# Patient Record
Sex: FEMALE | Employment: FULL TIME | URBAN - METROPOLITAN AREA
[De-identification: names, ages, dates, MRNs, and addresses within clinical notes are randomized per-mention and may not be internally consistent; named-entity substitution may affect disease eponyms.]

---

## 2017-01-10 ENCOUNTER — OFFICE VISIT (OUTPATIENT)
Dept: INTERNAL MEDICINE | Facility: CLINIC | Age: 32
End: 2017-01-10
Payer: COMMERCIAL

## 2017-01-10 VITALS
HEART RATE: 99 BPM | RESPIRATION RATE: 16 BRPM | DIASTOLIC BLOOD PRESSURE: 84 MMHG | OXYGEN SATURATION: 95 % | WEIGHT: 146.2 LBS | BODY MASS INDEX: 25.08 KG/M2 | TEMPERATURE: 98.9 F | SYSTOLIC BLOOD PRESSURE: 128 MMHG

## 2017-01-10 DIAGNOSIS — K21.9 GASTROESOPHAGEAL REFLUX DISEASE WITHOUT ESOPHAGITIS: Primary | ICD-10-CM

## 2017-01-10 PROCEDURE — 99214 OFFICE O/P EST MOD 30 MIN: CPT | Performed by: INTERNAL MEDICINE

## 2017-01-10 NOTE — MR AVS SNAPSHOT
After Visit Summary   1/10/2017    Melissa Benavidez    MRN: [id number]           Patient Information     Date Of Birth          1985        Visit Information        Provider Department      1/10/2017 4:20 PM Fiona Vazquez MD [facility]        Today's Diagnoses     Gastroesophageal reflux disease without esophagitis    -  1       Care Instructions    Try to avoid caffeine, alcohol, ibuprofen.   If not any better in 10-14 days, call for further testing.     If symptoms go away, continue the omeprazole for 2 months. The last week take every other day.   If symptoms come back, try pepcid 20 mg daily instead. If that doesn't work, go back on the omeprazole.         Follow-ups after your visit        Your next 10 appointments already scheduled     Jan 17, 2017  4:15 PM   Nurse Only with [place] OB NURSE   [facility]    [address] [place]  [facility] [id number] [phone]            Feb 15, 2017  3:00 PM   PHYSICAL with Heather Villagomez,    [facility]    [address] [place]  [facility] [address] [phone]              Who to contact     If you have questions or need follow up information about today's clinic visit or your schedule please contact [facility] directly at [phone].  Normal or non-critical lab and imaging results will be communicated to you by MyChart, letter or phone within 4 business days after the clinic has received the results. If you do not hear from us within 7 days, please contact the clinic through MyChart or phone. If you have a critical or abnormal lab result, we will notify you by phone as soon as possible.  Submit refill requests through [employer] or call your pharmacy and they will forward the refill request to us. Please allow 3 business days for your refill to be completed.          Additional Information About Your  "Visit        [employer] Information     [employer] lets you send messages to your doctor, view your test results, renew your prescriptions, schedule appointments and more. To sign up, go to [email or web address] . Click on \"Log in\" on the left side of the screen, which will take you to the Welcome page. Then click on \"Sign up Now\" on the right side of the page.     You will be asked to enter the access code listed below, as well as some personal information. Please follow the directions to create your username and password.     Your access code is: [email or web address]  Expires: 4/10/2017  4:52 PM     Your access code will  in 90 days. If you need help or a new code, please call your [place] clinic or [phone].        Care EveryWhere ID     This is your Care EveryWhere ID. This could be used by other organizations to access your [place] medical records  [id number]        Your Vitals Were     Pulse Temperature Respirations Pulse Oximetry          99 98.9  F (37.2  C) (Oral) 16 95%         Blood Pressure from Last 3 Encounters:   01/10/17 128/84   10/27/16 120/80   16 100/80    Weight from Last 3 Encounters:   01/10/17 146 lb 3.2 oz (66.316 kg)   10/27/16 139 lb 11.2 oz (63.368 kg)   16 138 lb (62.596 kg)              Today, you had the following     No orders found for display         Today's Medication Changes          These changes are accurate as of: 1/10/17  4:52 PM.  If you have any questions, ask your nurse or doctor.               Start taking these medicines.        Dose/Directions    omeprazole 20 MG CR capsule   Commonly known as:  priLOSEC   Used for:  Gastroesophageal reflux disease without esophagitis   Started by:  Fiona Vazquez MD        Dose:  20 mg   Take 1 capsule (20 mg) by mouth daily   Quantity:  30 capsule   Refills:  3         These medicines have changed or have updated prescriptions.        Dose/Directions    amphetamine-dextroamphetamine 20 MG per tablet   Commonly known as:  " ADDERALL   This may have changed:  Another medication with the same name was removed. Continue taking this medication, and follow the directions you see here.   Used for:  Attention-deficit hyperactivity disorder, predominantly hyperactive type   Changed by:  Franca Mandujano MD        Dose:  20 mg   Take 1 tablet (20 mg) by mouth 2 times daily   Quantity:  60 tablet   Refills:  0            Where to get your medicines      These medications were sent to [id number] - [place], [place] - [address] [facility]  [address] [facility] [address]     Phone:  [phone]    - omeprazole 20 MG CR capsule             Primary Care Provider Office Phone # Fax #    Franca Mandujano [phone]       [facility] [address] [place] [address]        Thank you!     Thank you for choosing [facility]  for your care. Our goal is always to provide you with excellent care. Hearing back from our patients is one way we can continue to improve our services. Please take a few minutes to complete the written survey that you may receive in the mail after your visit with us. Thank you!             Your Updated Medication List - Protect others around you: Learn how to safely use, store and throw away your medicines at www.disposemymeds.org.          This list is accurate as of: 1/10/17  4:52 PM.  Always use your most recent med list.                   Brand Name Dispense Instructions for use    amphetamine-dextroamphetamine 20 MG per tablet    ADDERALL    60 tablet    Take 1 tablet (20 mg) by mouth 2 times daily       buPROPion 150 MG 24 hr tablet    WELLBUTRIN XL    90 tablet    Take 1 tablet (150 mg) by mouth every morning       FLUoxetine 10 MG capsule    PROzac    90 capsule    Take 1 capsule (10 mg) by mouth daily       ibuprofen 200 MG tablet    ADVIL/MOTRIN    30 tablet    Take 3 tablets by mouth every 8 hours as needed for pain.       medroxyPROGESTERone 150  MG/ML injection    DEPO-PROVERA    1 mL    Inject 1 mL (150 mg) into the muscle every 3 months       omeprazole 20 MG CR capsule    priLOSEC    30 capsule    Take 1 capsule (20 mg) by mouth daily

## 2017-01-10 NOTE — NURSING NOTE
"Chief Complaint   Patient presents with     Nausea     Nausea and vomiting - negative pregnancy test at home, checked 3 times. Possibly acid reflux- can taste bile in back of throat       Initial /84 mmHg  Pulse 99  Temp(Src) 98.9  F (37.2  C) (Oral)  Resp 16  Wt 146 lb 3.2 oz (66.316 kg)  SpO2 95% Estimated body mass index is 25.08 kg/(m^2) as calculated from the following:    Height as of 7/7/16: 5' 4\" (1.626 m).    Weight as of this encounter: 146 lb 3.2 oz (66.316 kg).  BP completed using cuff size: federico Phelps CMA      "

## 2017-01-10 NOTE — PROGRESS NOTES
SUBJECTIVE:                                                    Melissa Benavidez is a 31 year old female who presents to clinic today for the following health issues:    Nausea and vomiting: she reports for the past month or so she's been having episodes of significant nausea and vomiting almost every day. This will usually come on sometimes in the morning or early afternoon and will start with feeling significant heat and flushing followed by a pain in her head that is like a band across the top of the head and severe nausea. Within a fairly short period of time, 5-10 minutes she will start to have vomiting or dry heaves. The head pain will resolve very quickly after the dry heaves start. This may continue on for up to an hour or so but then typically will resolve without further episodes most days. She does not tend to get stomach pain with it but instead gets very strong aching sensation in her throat, can have some water brash and occasionally some belching. She reports she tends to have a small protein bar in the morning when she takes her Adderall but then usually does not eat food until evening. This does not seem to happen soon after eating. She did a pregnancy test 3 times and that was negative each time.    She recently started Senokot and Colace because of chronic constipation but is not sure that's related to the symptoms.  She has some occasional discomfort in the epigastrium but not severe pain. She does feel bloated on and off. Her stools are a little looser but that may be related to the Colace and Senokot.    She drinks caffeine 1-2 20 ounce beverages per day. She has alcohol a few times in the weekend but not daily, not excessive amounts each time. She is a smoker. She takes ibuprofen 2-3 tablets most days.    Patient Active Problem List   Diagnosis     Hyperlipidemia LDL goal <160     Migraine headache     ADHD (attention deficit hyperactivity disorder)     Anxiety     Mild major depression  (H)     Major depressive disorder, recurrent episode, mild (H)     Controlled substance agreement signed     Current Outpatient Prescriptions   Medication Sig Dispense Refill     amphetamine-dextroamphetamine (ADDERALL) 20 MG per tablet Take 1 tablet (20 mg) by mouth 2 times daily 60 tablet 0     FLUoxetine (PROZAC) 10 MG capsule Take 1 capsule (10 mg) by mouth daily 90 capsule 0     buPROPion (WELLBUTRIN XL) 150 MG 24 hr tablet Take 1 tablet (150 mg) by mouth every morning 90 tablet 0     medroxyPROGESTERone (DEPO-PROVERA) 150 MG/ML injection Inject 1 mL (150 mg) into the muscle every 3 months 1 mL 3     ibuprofen (ADVIL,MOTRIN) 200 MG tablet Take 3 tablets by mouth every 8 hours as needed for pain. 30 tablet 0     amphetamine-dextroamphetamine (ADDERALL) 10 MG tablet Take 1 tablet (10 mg) by mouth 2 times daily 60 tablet 0      Social History   Substance Use Topics     Smoking status: Current Every Day Smoker -- 0.50 packs/day for 3 years     Last Attempt to Quit: 2012     Smokeless tobacco: Never Used      Comment: 5 -10 cigarrets daily     Alcohol Use: 0.0 oz/week     0 Standard drinks or equivalent per week      Comment: 1-2 drinks/week      Past Surgical History   Procedure Laterality Date     Gyn surgery  3/5/11             ROS:  o fever, chills, hemoptysis, persistent abdominal pain, melena, chest pain, other headaches, focal neurologic symptoms    OBJECTIVE:                                                    /84 mmHg  Pulse 99  Temp(Src) 98.9  F (37.2  C) (Oral)  Resp 16  Wt 146 lb 3.2 oz (66.316 kg)  SpO2 95%  Body mass index is 25.08 kg/(m^2).    Abdomen: Bowel sounds normal, soft, there is mild tenderness in the midepigastrium without rebound or guarding. No hepatosplenomegaly. No masses.        ASSESSMENT/PLAN:                                                            1. Gastroesophageal reflux disease without esophagitis  Her symptoms are most suggestive of acid-related  irritation, likely some reflux, particularly with the water brash and gagging. It is less likely that these symptoms would be related to gallbladder issue but that is possible. Doubt pancreatitis as she does not have any persistent pain.  Recommend start on omeprazole 20 mg daily, avoid caffeine, alcohol, tobacco and NSAIDs. If not improving in 10-14 days, consider abdominal ultrasound and/or EGD.   See patient instructions.   Recommend ED for severe abdominal pain, unremitting nausea and vomiting  - omeprazole (PRILOSEC) 20 MG CR capsule; Take 1 capsule (20 mg) by mouth daily  Dispense: 30 capsule; Refill: 3        Fiona Vazquez MD  [facility]

## 2017-01-17 ENCOUNTER — ALLIED HEALTH/NURSE VISIT (OUTPATIENT)
Dept: NURSING | Facility: CLINIC | Age: 32
End: 2017-01-17
Payer: COMMERCIAL

## 2017-01-17 VITALS — BODY MASS INDEX: 24.76 KG/M2 | WEIGHT: 144.3 LBS | SYSTOLIC BLOOD PRESSURE: 118 MMHG | DIASTOLIC BLOOD PRESSURE: 78 MMHG

## 2017-01-17 PROCEDURE — 96372 THER/PROPH/DIAG INJ SC/IM: CPT

## 2017-01-17 NOTE — NURSING NOTE
"Chief Complaint   Patient presents with     Imm/Inj       Initial /78 mmHg  Wt 144 lb 4.8 oz (65.454 kg) Estimated body mass index is 24.76 kg/(m^2) as calculated from the following:    Height as of 16: 5' 4\" (1.626 m).    Weight as of this encounter: 144 lb 4.8 oz (65.454 kg).  BP completed using cuff size: regular        The following HM Due: Annual exam (scheduled for 02/15/17).      The following patient reported/Care Every where data was sent to:  P ABSTRACT QUALITY INITIATIVES [28117]                     "

## 2017-01-17 NOTE — MR AVS SNAPSHOT
"              After Visit Summary   1/17/2017    Melissa Benavidez    MRN: [id number]           Patient Information     Date Of Birth          1985        Visit Information        Provider Department      1/17/2017 4:15 PM RI OB NURSE [facility]        Today's Diagnoses     Contraception    -  1        Follow-ups after your visit        Your next 10 appointments already scheduled     Feb 15, 2017  3:00 PM   PHYSICAL with Heather Villagomez, DO   [facility]    [address]  [facility] [id number] [phone]              Who to contact     If you have questions or need follow up information about today's clinic visit or your schedule please contact [facility] directly at [phone].  Normal or non-critical lab and imaging results will be communicated to you by MyChart, letter or phone within 4 business days after the clinic has received the results. If you do not hear from us within 7 days, please contact the clinic through MyChart or phone. If you have a critical or abnormal lab result, we will notify you by phone as soon as possible.  Submit refill requests through [employer] or call your pharmacy and they will forward the refill request to us. Please allow 3 business days for your refill to be completed.          Additional Information About Your Visit        [employer] Information     [employer] lets you send messages to your doctor, view your test results, renew your prescriptions, schedule appointments and more. To sign up, go to [email or web address] . Click on \"Log in\" on the left side of the screen, which will take you to the Welcome page. Then click on \"Sign up Now\" on the right side of the page.     You will be asked to enter the access code listed below, as well as some personal information. Please follow the directions to create your username and password.     Your access code is: [email or web address]  Expires: " 4/10/2017  4:52 PM     Your access code will  in 90 days. If you need help or a new code, please call your [facility] or [phone].        Care EveryWhere ID     This is your Care EveryWhere ID. This could be used by other organizations to access your [place] medical records  [id number]         Blood Pressure from Last 3 Encounters:   17 118/78   01/10/17 128/84   10/27/16 120/80    Weight from Last 3 Encounters:   17 144 lb 4.8 oz (65.454 kg)   01/10/17 146 lb 3.2 oz (66.316 kg)   10/27/16 139 lb 11.2 oz (63.368 kg)              We Performed the Following     INJECTION INTRAMUSCULAR OR SUB-Q     Medroxyprogesterone inj  1mg   (Depo Provera J-Code)        Primary Care Provider Office Phone # Fax #    Franca Mandujano [phone]       [facility] [address] [place] [address]        Thank you!     Thank you for choosing [facility]  for your care. Our goal is always to provide you with excellent care. Hearing back from our patients is one way we can continue to improve our services. Please take a few minutes to complete the written survey that you may receive in the mail after your visit with us. Thank you!             Your Updated Medication List - Protect others around you: Learn how to safely use, store and throw away your medicines at www.disposemymeds.org.          This list is accurate as of: 17  4:42 PM.  Always use your most recent med list.                   Brand Name Dispense Instructions for use    amphetamine-dextroamphetamine 20 MG per tablet    ADDERALL    60 tablet    Take 1 tablet (20 mg) by mouth 2 times daily       buPROPion 150 MG 24 hr tablet    WELLBUTRIN XL    90 tablet    Take 1 tablet (150 mg) by mouth every morning       FLUoxetine 10 MG capsule    PROzac    90 capsule    Take 1 capsule (10 mg) by mouth daily       ibuprofen 200 MG tablet    ADVIL/MOTRIN    30 tablet    Take 3 tablets by mouth every  8 hours as needed for pain.       medroxyPROGESTERone 150 MG/ML injection    DEPO-PROVERA    1 mL    Inject 1 mL (150 mg) into the muscle every 3 months       omeprazole 20 MG CR capsule    priLOSEC    30 capsule    Take 1 capsule (20 mg) by mouth daily

## 2017-01-24 DIAGNOSIS — F90.1 ATTENTION-DEFICIT HYPERACTIVITY DISORDER, PREDOMINANTLY HYPERACTIVE TYPE: Primary | ICD-10-CM

## 2017-01-24 RX ORDER — DEXTROAMPHETAMINE SACCHARATE, AMPHETAMINE ASPARTATE, DEXTROAMPHETAMINE SULFATE AND AMPHETAMINE SULFATE 5; 5; 5; 5 MG/1; MG/1; MG/1; MG/1
20 TABLET ORAL 2 TIMES DAILY
Qty: 60 TABLET | Refills: 0 | Status: SHIPPED | OUTPATIENT
Start: 2017-01-24 | End: 2017-03-01

## 2017-01-24 NOTE — TELEPHONE ENCOUNTER
adderall    *please call when completed. Patient aware a week early and isn't expecting until time it's due.* [phone]  Last Written Prescription Date:  1/2/17  Last Fill Quantity: 60,   # refills: 0  Last Office Visit with [facility], [facility] or [facility] prescribing provider: 9/29/16 w/ Taylor for Add  Future Office visit:    Next 5 appointments (look out 90 days)     Feb 15, 2017  3:00 PM   PHYSICAL with Heather Villagomez,    [facility] ([facility])    [facility] [address]  [facility] [id number] [phone]                   Routing refill request to provider for review/approval because:  Drug not on the [facility], [facility] or [facility] refill protocol or controlled substance    Please bring down to pharmacy.

## 2017-02-15 ENCOUNTER — OFFICE VISIT (OUTPATIENT)
Dept: OBGYN | Facility: CLINIC | Age: 32
End: 2017-02-15
Payer: COMMERCIAL

## 2017-02-15 VITALS
SYSTOLIC BLOOD PRESSURE: 132 MMHG | WEIGHT: 143.5 LBS | HEIGHT: 64 IN | BODY MASS INDEX: 24.5 KG/M2 | DIASTOLIC BLOOD PRESSURE: 88 MMHG

## 2017-02-15 DIAGNOSIS — Z30.013 ENCOUNTER FOR INITIAL PRESCRIPTION OF INJECTABLE CONTRACEPTIVE: Primary | ICD-10-CM

## 2017-02-15 DIAGNOSIS — F51.01 PRIMARY INSOMNIA: ICD-10-CM

## 2017-02-15 DIAGNOSIS — Z00.00 ROUTINE GENERAL MEDICAL EXAMINATION AT A HEALTH CARE FACILITY: ICD-10-CM

## 2017-02-15 PROCEDURE — 99395 PREV VISIT EST AGE 18-39: CPT | Performed by: FAMILY MEDICINE

## 2017-02-15 RX ORDER — MEDROXYPROGESTERONE ACETATE 150 MG/ML
150 INJECTION, SUSPENSION INTRAMUSCULAR
Qty: 3 ML | Refills: 3 | OUTPATIENT
Start: 2017-02-15 | End: 2018-06-26

## 2017-02-15 RX ORDER — ZOLPIDEM TARTRATE 5 MG/1
5 TABLET ORAL
Qty: 30 TABLET | Refills: 1 | Status: SHIPPED | OUTPATIENT
Start: 2017-02-15 | End: 2017-05-03

## 2017-02-15 NOTE — PATIENT INSTRUCTIONS
ambien before bed   Return yearly     Dr. Heather Villagomez, DO    Obstetrics and Gynecology  [facility] [place]

## 2017-02-15 NOTE — LETTER
[place] [address]  [facility] [address]  [phone]        February 20, 2018    Melissa Benavidez  [address] [place] [address] [address]  [facility] [address]              Dear Melissa Benavidez    This is to remind you that your fasting lab is due.    You may call our office at [phone] to schedule an appointment.    Please disregard this notice if you have already had your labs drawn or made an appointment.        Sincerely,        Heather Villagomez MD

## 2017-02-15 NOTE — NURSING NOTE
"Chief Complaint   Patient presents with     Gyn Exam   Discuss sleep issues.  Alycia Melara MA      Initial /88  Ht 5' 4\" (1.626 m)  Wt 143 lb 8 oz (65.1 kg)  BMI 24.63 kg/m2 Estimated body mass index is 24.63 kg/(m^2) as calculated from the following:    Height as of this encounter: 5' 4\" (1.626 m).    Weight as of this encounter: 143 lb 8 oz (65.1 kg).  Medication Reconciliation: complete    "

## 2017-02-15 NOTE — MR AVS SNAPSHOT
After Visit Summary   2/15/2017    Melissa Benavidez    MRN: [id number]           Patient Information     Date Of Birth          1985        Visit Information        Provider Department      2/15/2017 3:00 PM Heather Villagomez,  [facility]        Today's Diagnoses     Encounter for initial prescription of injectable contraceptive    -  1    Routine general medical examination at a health care facility        Primary insomnia          Care Instructions    ambien before bed   Return yearly     Dr. Heather Villagomez, DO    Obstetrics and Gynecology  [facility]               Follow-ups after your visit        Future tests that were ordered for you today     Open Future Orders        Priority Expected Expires Ordered    CBC with platelets Routine  2/15/2018 2/15/2017    Comprehensive metabolic panel Routine  2/15/2018 2/15/2017    Lipid panel reflex to direct LDL Routine  2/15/2018 2/15/2017    TSH with free T4 reflex Routine  2/15/2018 2/15/2017            Who to contact     If you have questions or need follow up information about today's clinic visit or your schedule please contact [facility] directly at [phone].  Normal or non-critical lab and imaging results will be communicated to you by [employer], letter or phone within 4 business days after the clinic has received the results. If you do not hear from us within 7 days, please contact the clinic through [employer] or phone. If you have a critical or abnormal lab result, we will notify you by phone as soon as possible.  Submit refill requests through [employer] or call your pharmacy and they will forward the refill request to us. Please allow 3 business days for your refill to be completed.          Additional Information About Your Visit        MyChart Information     [employer] lets you send messages to your doctor, view your test results, renew your prescriptions, schedule  "appointments and more. To sign up, go to [email or web address] . Click on \"Log in\" on the left side of the screen, which will take you to the Welcome page. Then click on \"Sign up Now\" on the right side of the page.     You will be asked to enter the access code listed below, as well as some personal information. Please follow the directions to create your username and password.     Your access code is: [email or web address]  Expires: 4/10/2017  4:52 PM     Your access code will  in 90 days. If you need help or a new code, please call your [place] clinic or [phone].        Care EveryWhere ID     This is your Care EveryWhere ID. This could be used by other organizations to access your [place] medical records  [id number]        Your Vitals Were     Height BMI (Body Mass Index)                5' 4\" (1.626 m) 24.63 kg/m2           Blood Pressure from Last 3 Encounters:   02/15/17 132/88   17 118/78   01/10/17 128/84    Weight from Last 3 Encounters:   02/15/17 143 lb 8 oz (65.1 kg)   17 144 lb 4.8 oz (65.5 kg)   01/10/17 146 lb 3.2 oz (66.3 kg)                 Today's Medication Changes          These changes are accurate as of: 2/15/17  3:43 PM.  If you have any questions, ask your nurse or doctor.               Start taking these medicines.        Dose/Directions    zolpidem 5 MG tablet   Commonly known as:  AMBIEN   Used for:  Primary insomnia   Started by:  Heather Villagomez DO        Dose:  5 mg   Take 1 tablet (5 mg) by mouth nightly as needed for sleep   Quantity:  30 tablet   Refills:  1         These medicines have changed or have updated prescriptions.        Dose/Directions    * medroxyPROGESTERone 150 MG/ML injection   Commonly known as:  DEPO-PROVERA   This may have changed:  Another medication with the same name was added. Make sure you understand how and when to take each.   Used for:  Unspecified contraceptive management   Changed by:  Corey Villanueva MD        Dose:  150 mg "   Inject 1 mL (150 mg) into the muscle every 3 months   Quantity:  1 mL   Refills:  3       * medroxyPROGESTERone 150 MG/ML injection   Commonly known as:  DEPO-PROVERA   This may have changed:  You were already taking a medication with the same name, and this prescription was added. Make sure you understand how and when to take each.   Used for:  Encounter for initial prescription of injectable contraceptive   Changed by:  Heather Villagomez DO        Dose:  150 mg   Inject 1 mL (150 mg) into the muscle every 3 months   Quantity:  3 mL   Refills:  3       * Notice:  This list has 2 medication(s) that are the same as other medications prescribed for you. Read the directions carefully, and ask your doctor or other care provider to review them with you.         Where to get your medicines      Some of these will need a paper prescription and others can be bought over the counter.  Ask your nurse if you have questions.     Bring a paper prescription for each of these medications     zolpidem 5 MG tablet       You don't need a prescription for these medications     medroxyPROGESTERone 150 MG/ML injection                Primary Care Provider Office Phone # Fax #    Franca Mandujano [phone]       [facility] [address] [place] [address]        Thank you!     Thank you for choosing [facility]  for your care. Our goal is always to provide you with excellent care. Hearing back from our patients is one way we can continue to improve our services. Please take a few minutes to complete the written survey that you may receive in the mail after your visit with us. Thank you!             Your Updated Medication List - Protect others around you: Learn how to safely use, store and throw away your medicines at www.disposemymeds.org.          This list is accurate as of: 2/15/17  3:43 PM.  Always use your most recent med list.                   Brand Name Dispense  Instructions for use    amphetamine-dextroamphetamine 20 MG per tablet    ADDERALL    60 tablet    Take 1 tablet (20 mg) by mouth 2 times daily       buPROPion 150 MG 24 hr tablet    WELLBUTRIN XL    90 tablet    Take 1 tablet (150 mg) by mouth every morning       FLUoxetine 10 MG capsule    PROzac    90 capsule    Take 1 capsule (10 mg) by mouth daily       ibuprofen 200 MG tablet    ADVIL/MOTRIN    30 tablet    Take 3 tablets by mouth every 8 hours as needed for pain.       * medroxyPROGESTERone 150 MG/ML injection    DEPO-PROVERA    1 mL    Inject 1 mL (150 mg) into the muscle every 3 months       * medroxyPROGESTERone 150 MG/ML injection    DEPO-PROVERA    3 mL    Inject 1 mL (150 mg) into the muscle every 3 months       omeprazole 20 MG CR capsule    priLOSEC    30 capsule    Take 1 capsule (20 mg) by mouth daily       zolpidem 5 MG tablet    AMBIEN    30 tablet    Take 1 tablet (5 mg) by mouth nightly as needed for sleep       * Notice:  This list has 2 medication(s) that are the same as other medications prescribed for you. Read the directions carefully, and ask your doctor or other care provider to review them with you.

## 2017-02-15 NOTE — PROGRESS NOTES
SUBJECTIVE:  Melissa Benavidez is an 31 year old  woman who presents for annual gyn exam. No LMP recorded. Patient has had an injection. Periods are absent due to Depo. Dysmenorrhea:none. Cyclic symptoms include none. No intermenstrual bleeding, spotting, or discharge.    Current contraception: depo provera   ALEXUS exposure: no  History of abnormal Pap smear: No  Family history of uterine or ovarian cancer: No  Regular self breast exam: Yes  History of abnormal mammogram: No  Family history of breast cancer: Yes: maternal great aunt  History of abnormal lipids: No    She notes that it is very difficult for her to sleep at night, and this has been an ongoing issue for her. It will take her a while to get to sleep and she will wake up every 2 hours or so. She will have some caffeine during the day, but stops drinking it around 9 or 10 am. She has tried melatonin and tylenol PM. She has not tried a prescription sleep aid in the past.     She does not need any medication refills today.     Past Medical History   Diagnosis Date     Hyperlipidemia LDL goal <160 2011     Seizure (H)         Gyn Hx:  Menarch age teens  STD Hx: none    Family History   Problem Relation Age of Onset     Family History Negative Mother      Substance Abuse Father       44yo      HEART DISEASE Maternal Grandmother      heart attack     Family History Negative Son      born  [place]     Family History Negative Sister      1/2 bro     Family History Negative Brother      1/2 bro     DIABETES No family hx of      CEREBROVASCULAR DISEASE No family hx of      CANCER No family hx of      Neurologic Disorder No family hx of        Past Surgical History   Procedure Laterality Date     Gyn surgery  3/5/11            Current Outpatient Prescriptions   Medication     amphetamine-dextroamphetamine (ADDERALL) 20 MG per tablet     omeprazole (PRILOSEC) 20 MG CR capsule     FLUoxetine (PROZAC) 10 MG capsule     buPROPion  "(WELLBUTRIN XL) 150 MG 24 hr tablet     medroxyPROGESTERone (DEPO-PROVERA) 150 MG/ML injection     ibuprofen (ADVIL,MOTRIN) 200 MG tablet     No current facility-administered medications for this visit.      Allergies   Allergen Reactions     Clindamycin/Lincomycin      Vicodin [Hydrocodone-Acetaminophen]        Social History   Substance Use Topics     Smoking status: Current Every Day Smoker     Packs/day: 0.50     Years: 3.00     Last attempt to quit: 12/27/2012     Smokeless tobacco: Never Used      Comment: 5 -10 cigarrets daily     Alcohol use 0.0 oz/week     0 Standard drinks or equivalent per week      Comment: 1-2 drinks/week       Review Of Systems  Ears/Nose/Throat: negative  Respiratory: No shortness of breath, dyspnea on exertion, cough, or hemoptysis  Cardiovascular: negative  Gastrointestinal: negative  Genitourinary: negative    This document serves as a record of the services and decisions personally performed and made by Dr. Heather Villagomez DO. It was created on her behalf by Reta Tomas, a trained medical scribe. The creation of this document is based the provider's statements to the medical scribe.  Reta Tomas February 15, 2017 3:07 PM       OBJECTIVE:  /88  Ht 5' 4\" (1.626 m)  Wt 143 lb 8 oz (65.1 kg)  BMI 24.63 kg/m2  General appearance: healthy, alert and no distress  Skin: Skin color, texture, turgor normal. No rashes or lesions.  Nose/Sinuses: Nares normal. No drainage.  Oropharynx: Lips, mucosa, and tongue normal. Teeth and gums normal.  Neck: Neck supple. No adenopathy. Thyroid symmetric, normal size,  Lungs: Percussion normal. Good diaphragmatic excursion. Lungs clear  Heart: PMI normal. No lifts, heaves, or thrills. RRR. No murmurs, clicks gallops or rub  Breasts: Inspection negative. No nipple discharge or bleeding. No masses.  Abdomen: Abdomen soft, non-tender. BS normal. No masses, organomegaly  Pelvic: Pelvic:  Pelvic examination   External genitalia normal   and vagina normal " rugatted   Examination of urethra normal no masses, tenderness, scarring  bladder, no masses or tenderness  Cervix no lesions or discharge  Bimanual exam with   Uterus 7 weeks size, mid position, mobile,no-tenderness, no descent   Adnexa/parametria  Without masses or tenderness  Rectovaginal deferred     ASSESSMENT:  Satisfactory annual gyn exam    PLAN:  Dx:  1)  Pap smear  2)  Mammography, lipids at appropriate intervals  3) insomnia - will try Ambien, discussed possible side effects and dosing    Rx:  Ambien 5 mg    PE:  Reviewed health maintenance including diet, regular exercise   and periodic exams.    The information in this document, created by the medical scribe for me, accurately reflects the services I personally performed and the decisions made by me. I have reviewed and approved this document for accuracy prior to leaving the patient care area.  2/15/2017 3:07 PM         Dr. Heather Villagomez, DO    OB/GYN   [facility]

## 2017-02-28 DIAGNOSIS — F90.1 ATTENTION-DEFICIT HYPERACTIVITY DISORDER, PREDOMINANTLY HYPERACTIVE TYPE: ICD-10-CM

## 2017-02-28 NOTE — TELEPHONE ENCOUNTER
Reason for Call:  Medication or medication refill:    Do you use a [facility]?  Name of the pharmacy and phone number for the current request:  [facility] [address] ([place]) - [phone]    Name of the medication requested: amphetamine-dextroamphetamine (ADDERALL) 20 MG per tablet    Other request: Please send bring written Rx to [facility] downstairs and call patient when done.    Can we leave a detailed message on this number? YES    Phone number patient can be reached at: Cell number on file:    Telephone Information:   Mobile [phone]       Best Time: any    Call taken on 2/28/2017 at 10:50 AM by Anna Corrigan

## 2017-03-01 RX ORDER — DEXTROAMPHETAMINE SACCHARATE, AMPHETAMINE ASPARTATE, DEXTROAMPHETAMINE SULFATE AND AMPHETAMINE SULFATE 5; 5; 5; 5 MG/1; MG/1; MG/1; MG/1
20 TABLET ORAL 2 TIMES DAILY
Qty: 60 TABLET | Refills: 0 | Status: SHIPPED | OUTPATIENT
Start: 2017-03-01 | End: 2017-03-27

## 2017-03-01 NOTE — TELEPHONE ENCOUNTER
Adderall      Last Written Prescription Date:  01/24/17  Last Fill Quantity: 60,   # refills: 0  Last Office Visit with [facility] or [facility] prescribing provider: 01/10/17  Future Office visit:       Routing refill request to provider for review/approval because:  Drug not on the [facility], [facility] or [facility] refill protocol or controlled substance    CSA in epic. Rx to [facility] when available.    Please advise, thanks.

## 2017-03-27 DIAGNOSIS — F90.1 ATTENTION-DEFICIT HYPERACTIVITY DISORDER, PREDOMINANTLY HYPERACTIVE TYPE: ICD-10-CM

## 2017-03-27 NOTE — TELEPHONE ENCOUNTER
Adderall      Last Written Prescription Date:  3/1/17  Last Fill Quantity: 60,   # refills: 0  Last Office Visit with [facility] or [facility] prescribing provider: 2/15/17  Future Office visit:       Routing refill request to provider for review/approval because:  Drug not on the [facility] or [facility] refill protocol or controlled substance

## 2017-03-29 RX ORDER — DEXTROAMPHETAMINE SACCHARATE, AMPHETAMINE ASPARTATE, DEXTROAMPHETAMINE SULFATE AND AMPHETAMINE SULFATE 5; 5; 5; 5 MG/1; MG/1; MG/1; MG/1
20 TABLET ORAL 2 TIMES DAILY
Qty: 60 TABLET | Refills: 0 | Status: SHIPPED | OUTPATIENT
Start: 2017-03-29 | End: 2017-05-09

## 2017-04-11 ENCOUNTER — ALLIED HEALTH/NURSE VISIT (OUTPATIENT)
Dept: NURSING | Facility: CLINIC | Age: 32
End: 2017-04-11
Payer: COMMERCIAL

## 2017-04-11 VITALS — BODY MASS INDEX: 25.63 KG/M2 | SYSTOLIC BLOOD PRESSURE: 132 MMHG | DIASTOLIC BLOOD PRESSURE: 70 MMHG | WEIGHT: 149.3 LBS

## 2017-04-11 DIAGNOSIS — Z30.42 ENCOUNTER FOR SURVEILLANCE OF INJECTABLE CONTRACEPTIVE: Primary | ICD-10-CM

## 2017-04-11 DIAGNOSIS — F41.9 ANXIETY: ICD-10-CM

## 2017-04-11 PROCEDURE — 96372 THER/PROPH/DIAG INJ SC/IM: CPT

## 2017-04-11 RX ORDER — BUPROPION HYDROCHLORIDE 150 MG/1
TABLET ORAL
Qty: 90 TABLET | Refills: 0 | Status: SHIPPED | OUTPATIENT
Start: 2017-04-11 | End: 2017-06-12

## 2017-04-11 NOTE — MR AVS SNAPSHOT
"              After Visit Summary   2017    Melissa Benavidez    MRN: [id number]           Patient Information     Date Of Birth          1985        Visit Information        Provider Department      2017 4:15 PM [facility] OB NURSE [facility]        Today's Diagnoses     Encounter for surveillance of injectable contraceptive    -  1       Follow-ups after your visit        Who to contact     If you have questions or need follow up information about today's clinic visit or your schedule please contact [facility] directly at [phone].  Normal or non-critical lab and imaging results will be communicated to you by [employer]hart, letter or phone within 4 business days after the clinic has received the results. If you do not hear from us within 7 days, please contact the clinic through [employer] or phone. If you have a critical or abnormal lab result, we will notify you by phone as soon as possible.  Submit refill requests through [employer] or call your pharmacy and they will forward the refill request to us. Please allow 3 business days for your refill to be completed.          Additional Information About Your Visit        MyChart Information     [employer] lets you send messages to your doctor, view your test results, renew your prescriptions, schedule appointments and more. To sign up, go to [email or web address] . Click on \"Log in\" on the left side of the screen, which will take you to the Welcome page. Then click on \"Sign up Now\" on the right side of the page.     You will be asked to enter the access code listed below, as well as some personal information. Please follow the directions to create your username and password.     Your access code is: [email or web address]  Expires: 7/10/2017  5:02 PM     Your access code will  in 90 days. If you need help or a new code, please call your [facility] or [phone].        Care EveryWhere ID     This is your Care EveryWhere ID. " This could be used by other organizations to access your [place] medical records  [id number]        Your Vitals Were     BMI (Body Mass Index)                   25.63 kg/m2            Blood Pressure from Last 3 Encounters:   04/11/17 132/70   02/15/17 132/88   01/17/17 118/78    Weight from Last 3 Encounters:   04/11/17 149 lb 4.8 oz (67.7 kg)   02/15/17 143 lb 8 oz (65.1 kg)   01/17/17 144 lb 4.8 oz (65.5 kg)              We Performed the Following     Medroxyprogesterone inj  1mg   (Depo Provera J-Code)          Today's Medication Changes          These changes are accurate as of: 4/11/17  5:02 PM.  If you have any questions, ask your nurse or doctor.               These medicines have changed or have updated prescriptions.        Dose/Directions    buPROPion 150 MG 24 hr tablet   Commonly known as:  WELLBUTRIN XL   This may have changed:  See the new instructions.   Used for:  Anxiety   Changed by:  Franca Mandujano MD        TAKE 1 TABLET (150 MG) BY MOUTH EVERY MORNING   Quantity:  90 tablet   Refills:  0            Where to get your medicines      These medications were sent to [facility]/pharmacy #[id number] - [place], [place] - [address] [facility]  [address] [facility] [address]     Phone:  [phone]     buPROPion 150 MG 24 hr tablet                Primary Care Provider Office Phone # Fax #    Franca Mandujano [phone]       [facility] [address] [place] [address]        Thank you!     Thank you for choosing [facility]  for your care. Our goal is always to provide you with excellent care. Hearing back from our patients is one way we can continue to improve our services. Please take a few minutes to complete the written survey that you may receive in the mail after your visit with us. Thank you!             Your Updated Medication List - Protect others around you: Learn how to safely use, store and throw away your medicines at  www.disposemymeds.org.          This list is accurate as of: 4/11/17  5:02 PM.  Always use your most recent med list.                   Brand Name Dispense Instructions for use    amphetamine-dextroamphetamine 20 MG per tablet    ADDERALL    60 tablet    Take 1 tablet (20 mg) by mouth 2 times daily       buPROPion 150 MG 24 hr tablet    WELLBUTRIN XL    90 tablet    TAKE 1 TABLET (150 MG) BY MOUTH EVERY MORNING       FLUoxetine 10 MG capsule    PROzac    90 capsule    Take 1 capsule (10 mg) by mouth daily       ibuprofen 200 MG tablet    ADVIL/MOTRIN    30 tablet    Take 3 tablets by mouth every 8 hours as needed for pain.       * medroxyPROGESTERone 150 MG/ML injection    DEPO-PROVERA    1 mL    Inject 1 mL (150 mg) into the muscle every 3 months       * medroxyPROGESTERone 150 MG/ML injection    DEPO-PROVERA    3 mL    Inject 1 mL (150 mg) into the muscle every 3 months       omeprazole 20 MG CR capsule    priLOSEC    30 capsule    Take 1 capsule (20 mg) by mouth daily       zolpidem 5 MG tablet    AMBIEN    30 tablet    Take 1 tablet (5 mg) by mouth nightly as needed for sleep       * Notice:  This list has 2 medication(s) that are the same as other medications prescribed for you. Read the directions carefully, and ask your doctor or other care provider to review them with you.

## 2017-04-11 NOTE — NURSING NOTE
"Chief Complaint   Patient presents with     Imm/Inj     Depo       Initial /70  Wt 149 lb 4.8 oz (67.7 kg)  BMI 25.63 kg/m2 Estimated body mass index is 25.63 kg/(m^2) as calculated from the following:    Height as of 2/15/17: 5' 4\" (1.626 m).    Weight as of this encounter: 149 lb 4.8 oz (67.7 kg).  BP completed using cuff size: regular        The following HM Due: NONE  Lab Results   Component Value Date    PAP NIL 2015         The following patient reported/Care Every where data was sent to:  P ABSTRACT QUALITY INITIATIVES [73703]       patient has appointment for today             "

## 2017-04-17 DIAGNOSIS — F41.9 ANXIETY: ICD-10-CM

## 2017-04-17 RX ORDER — FLUOXETINE 10 MG/1
CAPSULE ORAL
Qty: 90 CAPSULE | Refills: 1 | Status: SHIPPED | OUTPATIENT
Start: 2017-04-17 | End: 2018-05-08

## 2017-04-24 ENCOUNTER — TELEPHONE (OUTPATIENT)
Dept: OBGYN | Facility: CLINIC | Age: 32
End: 2017-04-24

## 2017-04-24 NOTE — TELEPHONE ENCOUNTER
Pt stopped in the office and is requesting to increase her ambien dosage to 10 mg.   She is currently taking 5 mg.     Please advise.     Thanks.   Pat

## 2017-04-26 NOTE — TELEPHONE ENCOUNTER
Please let her know I would prefer that she discuss this with family practice or internal medicine physician as the upper limit   Recommended for ambien in women is 5 mg.     Dr. Heather Villagomez, DO    Obstetrics and Gynecology  [facility]

## 2017-05-01 DIAGNOSIS — F51.01 PRIMARY INSOMNIA: ICD-10-CM

## 2017-05-01 NOTE — TELEPHONE ENCOUNTER
Recommend not increasing ambien.  Could trial the longer acting- ambien 6.25mg.  Or could trial a different sleeping medication.

## 2017-05-01 NOTE — TELEPHONE ENCOUNTER
Pt calls, she was started on Ambien 5 mg by Dr. Villagomez about 2 months ago. Pt states that this helps her fall asleep, but she still wakes up frequently during the night. She asked Dr. Villagomez about increasing the dose and she advised her to speak to Dr. Mandujano as the FDA recommends women take 5mg. See 4/24/17 Telephone encounter for details.     Pt asking if Dr. Danielle would be able to increase the dose for her.

## 2017-05-03 RX ORDER — ZOLPIDEM TARTRATE 6.25 MG/1
6.25 TABLET, FILM COATED, EXTENDED RELEASE ORAL
Qty: 60 TABLET | Refills: 0 | Status: SHIPPED | OUTPATIENT
Start: 2017-05-03 | End: 2017-07-17

## 2017-05-03 NOTE — TELEPHONE ENCOUNTER
Pt called back, she would like to trial the 6.25 long-acting Ambien.  She is getting asleep but she wakes and can't get back to sleep.    Med pended  Pharmacy listed  Please advise  Billy PIZARRO RN

## 2017-05-09 DIAGNOSIS — F90.1 ATTENTION-DEFICIT HYPERACTIVITY DISORDER, PREDOMINANTLY HYPERACTIVE TYPE: ICD-10-CM

## 2017-05-09 NOTE — TELEPHONE ENCOUNTER
Reason for call: Medication   If this is a refill request, has the caller requested the refill from the pharmacy already? No  Will the patient be using a [facility]? Yes  Name of the pharmacy and phone number for the current request:     Name of the medication requested: Adderall 20 mg    Other request: anytime    Phone Number Pt can be reached at: Cell number on file:    Telephone Information:   Mobile [phone]     Best Time: Anytime  Can we leave a detailed message on this number? YES

## 2017-05-09 NOTE — TELEPHONE ENCOUNTER
Adderall      Last Written Prescription Date:  3-29-17  Last Fill Quantity: 60,   # refills: 0  Last Office Visit with [facility] or  [facility] prescribing provider: 1-10-17  Future Office visit:       Routing refill request to provider for review/approval because:  Drug not on the [facility], [facility] or  Health refill protocol or controlled substance    Signed CSA form in chart.    Left message for pt to call back to verify which pharm she uses for Adderall.

## 2017-05-10 RX ORDER — DEXTROAMPHETAMINE SACCHARATE, AMPHETAMINE ASPARTATE, DEXTROAMPHETAMINE SULFATE AND AMPHETAMINE SULFATE 5; 5; 5; 5 MG/1; MG/1; MG/1; MG/1
20 TABLET ORAL 2 TIMES DAILY
Qty: 60 TABLET | Refills: 0 | Status: SHIPPED | OUTPATIENT
Start: 2017-05-10 | End: 2017-06-12

## 2017-05-14 DIAGNOSIS — F41.9 ANXIETY: ICD-10-CM

## 2017-05-15 RX ORDER — BUPROPION HYDROCHLORIDE 150 MG/1
TABLET ORAL
Qty: 90 TABLET | Refills: 0 | OUTPATIENT
Start: 2017-05-15

## 2017-05-15 NOTE — TELEPHONE ENCOUNTER
Bupropion       Last Written Prescription Date: 04/11/17   (not needed yet)  Last Fill Quantity: 90; # refills: 0  Last Office Visit with [facility], [facility] or [facility] prescribing provider:  02/15/17 Dahlia        Last PHQ-9 score on record=   PHQ-9 SCORE 6/6/2016   Total Score -   Total Score 5       Lab Results   Component Value Date    AST 16 07/08/2016     Lab Results   Component Value Date    ALT 31 07/08/2016       Labs showing if normal/abnormal  Lab Results   Component Value Date    AST 16 07/08/2016    ALT 31 07/08/2016

## 2017-05-21 DIAGNOSIS — F41.9 ANXIETY: ICD-10-CM

## 2017-05-22 RX ORDER — BUPROPION HYDROCHLORIDE 150 MG/1
TABLET ORAL
Qty: 90 TABLET | Refills: 0 | OUTPATIENT
Start: 2017-05-22

## 2017-05-22 NOTE — TELEPHONE ENCOUNTER
Early fill? See below    buPROPion (WELLBUTRIN XL) 150 MG 24 hr tablet 90 tablet 0 4/11/2017  No   Sig: TAKE 1 TABLET (150 MG) BY MOUTH EVERY MORNING   Class: E-Prescribe   Order: [id number]   E-Prescribing Status: Receipt confirmed by pharmacy (4/11/2017 10:49 AM CDT)

## 2017-06-04 DIAGNOSIS — F51.01 PRIMARY INSOMNIA: ICD-10-CM

## 2017-06-04 DIAGNOSIS — F90.1 ATTENTION-DEFICIT HYPERACTIVITY DISORDER, PREDOMINANTLY HYPERACTIVE TYPE: ICD-10-CM

## 2017-06-04 DIAGNOSIS — F41.9 ANXIETY: ICD-10-CM

## 2017-06-11 DIAGNOSIS — K21.9 GASTROESOPHAGEAL REFLUX DISEASE WITHOUT ESOPHAGITIS: ICD-10-CM

## 2017-06-12 RX ORDER — BUPROPION HYDROCHLORIDE 150 MG/1
150 TABLET ORAL EVERY MORNING
Qty: 90 TABLET | Refills: 0 | OUTPATIENT
Start: 2017-06-12

## 2017-06-12 RX ORDER — BUPROPION HYDROCHLORIDE 150 MG/1
TABLET ORAL
Qty: 90 TABLET | Refills: 0 | Status: SHIPPED | OUTPATIENT
Start: 2017-06-12 | End: 2018-05-08

## 2017-06-12 RX ORDER — DEXTROAMPHETAMINE SACCHARATE, AMPHETAMINE ASPARTATE, DEXTROAMPHETAMINE SULFATE AND AMPHETAMINE SULFATE 5; 5; 5; 5 MG/1; MG/1; MG/1; MG/1
20 TABLET ORAL 2 TIMES DAILY
Qty: 60 TABLET | Refills: 0 | Status: SHIPPED | OUTPATIENT
Start: 2017-06-12 | End: 2017-06-19

## 2017-06-12 NOTE — TELEPHONE ENCOUNTER
Patient called and left message to call back.    Called patient and patient informed she is due for an office visit for Wellbutrin.  Patient also requests a refill for Adderall.  Transferred to scheduling to make an office visit.    Adderall 20 mg       Last Written Prescription Date:  5/10/17  Last Fill Quantity: 60,   # refills: 0  Last Office Visit with [facility], [facility] or [facility] prescribing provider: 1/10/17 [facility]  Future Office visit:       Routing refill request to provider for review/approval because:  Drug not on the [facility], [facility] or [facility] refill protocol or controlled substance  Signed CSA form in chart.      Bupropion  Routing refill request to provider for review/approval because:  Patient needs to be seen because it has been more than 1 year since last office visit.

## 2017-06-13 NOTE — TELEPHONE ENCOUNTER
Omeprazole       Last Written Prescription Date: 1/10/17  Last Fill Quantity: 30,  # refills: 3   Last Office Visit with G, P or [facility] prescribing provider: 1/10/17    Need update-Called pt, left message       Per 1/10/17 dict-If symptoms go away, continue the omeprazole for 2 months. The last week take every other day.   If symptoms come back, try pepcid 20 mg daily instead. If that doesn't work, go back on the omeprazole.

## 2017-06-19 DIAGNOSIS — F90.1 ATTENTION-DEFICIT HYPERACTIVITY DISORDER, PREDOMINANTLY HYPERACTIVE TYPE: ICD-10-CM

## 2017-06-19 RX ORDER — DEXTROAMPHETAMINE SACCHARATE, AMPHETAMINE ASPARTATE, DEXTROAMPHETAMINE SULFATE AND AMPHETAMINE SULFATE 5; 5; 5; 5 MG/1; MG/1; MG/1; MG/1
20 TABLET ORAL 2 TIMES DAILY
Qty: 60 TABLET | Refills: 0 | Status: SHIPPED | OUTPATIENT
Start: 2017-06-19 | End: 2017-07-17

## 2017-06-19 NOTE — TELEPHONE ENCOUNTER
Adderall XR 20      Last Written Prescription Date: 06/12/2017  Last Fill Quantity: 60,  # refills: 0   Last Office Visit with [facility], [facility] or [facility] prescribing provider: 02/15/2017                                               Thanks!    Alia Pranke, Float Technician   [place] for [facility]

## 2017-06-21 NOTE — TELEPHONE ENCOUNTER
Pt calls back informed she is due for appt. Pt unable to schedule right now, she will call back later to do this. Pt advised she needs to schedule before refill can be approved and she should speak to RN after she schedules appt.

## 2017-07-17 DIAGNOSIS — F51.01 PRIMARY INSOMNIA: ICD-10-CM

## 2017-07-17 DIAGNOSIS — F90.1 ATTENTION-DEFICIT HYPERACTIVITY DISORDER, PREDOMINANTLY HYPERACTIVE TYPE: ICD-10-CM

## 2017-07-17 RX ORDER — DEXTROAMPHETAMINE SACCHARATE, AMPHETAMINE ASPARTATE, DEXTROAMPHETAMINE SULFATE AND AMPHETAMINE SULFATE 5; 5; 5; 5 MG/1; MG/1; MG/1; MG/1
20 TABLET ORAL 2 TIMES DAILY
Qty: 60 TABLET | Refills: 0 | Status: SHIPPED | OUTPATIENT
Start: 2017-07-17 | End: 2017-08-17

## 2017-07-17 RX ORDER — ZOLPIDEM TARTRATE 6.25 MG/1
6.25 TABLET, FILM COATED, EXTENDED RELEASE ORAL
Qty: 60 TABLET | Refills: 0 | Status: SHIPPED | OUTPATIENT
Start: 2017-07-17 | End: 2017-09-12

## 2017-07-17 NOTE — TELEPHONE ENCOUNTER
Rx's signed by provider.  Rx hand carried to [facility].  Pt informed.

## 2017-07-17 NOTE — TELEPHONE ENCOUNTER
Adderall 20mg; Ambien XR 10mg      Last Written Prescription Date:  6/19/17; 5/3/17  Last Fill Quantity: 60,   # refills: 0  Last Office Visit with [facility], [facility] or [facility] prescribing provider: 1/10/17  Future Office visit:    Next 5 appointments (look out 90 days)     Jul 18, 2017  4:15 PM CDT   Nurse Only with [place] OB NURSE   [facility] ([facility])    [address]  [facility] [id number] [phone]                 CSA on file.   Walk to [facility], notify pt when dropped off.     Routing refill request to provider for review/approval because:  Drug not on the [facility], [facility] or [facility] refill protocol or controlled substance

## 2017-07-17 NOTE — TELEPHONE ENCOUNTER
Reason for Call:  Medication or medication refill:    Do you use a [facility]?  Name of the pharmacy and phone number for the current request:  [facility] [address] ([place]) - [phone]    Name of the medication requested: adderall 20 mg and ambien xr 10 mg    Other request: none    Can we leave a detailed message on this number? YES    Phone number patient can be reached at: Cell number on file:    Telephone Information:   Mobile [phone]       Best Time: any    Call taken on 7/17/2017 at 3:35 PM by Jenna Quick

## 2017-07-19 ENCOUNTER — ALLIED HEALTH/NURSE VISIT (OUTPATIENT)
Dept: NURSING | Facility: CLINIC | Age: 32
End: 2017-07-19
Payer: COMMERCIAL

## 2017-07-19 VITALS
SYSTOLIC BLOOD PRESSURE: 102 MMHG | WEIGHT: 143 LBS | BODY MASS INDEX: 24.55 KG/M2 | TEMPERATURE: 98.3 F | DIASTOLIC BLOOD PRESSURE: 80 MMHG

## 2017-07-19 LAB — HCG, QUAL URINE: NEGATIVE

## 2017-07-19 PROCEDURE — 96372 THER/PROPH/DIAG INJ SC/IM: CPT

## 2017-07-19 PROCEDURE — 84703 CHORIONIC GONADOTROPIN ASSAY: CPT

## 2017-07-19 PROCEDURE — 99207 ZZC NO CHARGE NURSE ONLY: CPT

## 2017-08-17 ENCOUNTER — TELEPHONE (OUTPATIENT)
Dept: INTERNAL MEDICINE | Facility: CLINIC | Age: 32
End: 2017-08-17

## 2017-08-17 DIAGNOSIS — F90.1 ATTENTION-DEFICIT HYPERACTIVITY DISORDER, PREDOMINANTLY HYPERACTIVE TYPE: ICD-10-CM

## 2017-08-17 NOTE — TELEPHONE ENCOUNTER
Adderall      Last Written Prescription Date:  7-17-17  Last Fill Quantity: 60,   # refills: 0  Last Office Visit with [facility], P or  Health prescribing provider: 9-29-16  Future Office visit:       Routing refill request to provider for review/approval because:  Drug not on the [facility], P or  Health refill protocol or controlled substance    Signed CSA form in chart.    [facility] carry [facility].    Please advise, thanks.

## 2017-08-17 NOTE — TELEPHONE ENCOUNTER
Reason for Call:  Medication or medication refill:    Do you use a [facility]?  Name of the pharmacy and phone number for the current request:  [facility] [address] ([place]) - [phone]    Name of the medication requested: adderall    Other request: none    Can we leave a detailed message on this number? YES    Phone number patient can be reached at: Home number on file [phone] (home)    Best Time: any    Call taken on 8/17/2017 at 10:49 AM by Milly Loyola

## 2017-08-18 RX ORDER — DEXTROAMPHETAMINE SACCHARATE, AMPHETAMINE ASPARTATE, DEXTROAMPHETAMINE SULFATE AND AMPHETAMINE SULFATE 5; 5; 5; 5 MG/1; MG/1; MG/1; MG/1
20 TABLET ORAL 2 TIMES DAILY
Qty: 60 TABLET | Refills: 0 | Status: SHIPPED | OUTPATIENT
Start: 2017-08-18 | End: 2017-09-12

## 2017-09-03 ENCOUNTER — HEALTH MAINTENANCE LETTER (OUTPATIENT)
Age: 32
End: 2017-09-03

## 2017-09-12 DIAGNOSIS — F90.1 ATTENTION-DEFICIT HYPERACTIVITY DISORDER, PREDOMINANTLY HYPERACTIVE TYPE: ICD-10-CM

## 2017-09-12 DIAGNOSIS — F51.01 PRIMARY INSOMNIA: ICD-10-CM

## 2017-09-12 NOTE — TELEPHONE ENCOUNTER
Pt left voice message requesting refill for Adderall and Ambien.     Left voice message for pt message was received and will send to [facility]. Asked pt to call back if she prefers a different pharmacy.    Fill at [facility].     Adderall 20mg; Zolpidem 6.25mg CR      Last Written Prescription Date:  8/18/17; 7/17/17  Last Fill Quantity: 60,   # refills: 0  Last Office Visit with [facility], P or  Health prescribing provider: 1/10/17  Future Office visit:       CSA on file.     Routing refill request to provider for review/approval because:  Drug not on the [facility], P or [employer] refill protocol or controlled substance

## 2017-09-13 RX ORDER — DEXTROAMPHETAMINE SACCHARATE, AMPHETAMINE ASPARTATE, DEXTROAMPHETAMINE SULFATE AND AMPHETAMINE SULFATE 5; 5; 5; 5 MG/1; MG/1; MG/1; MG/1
20 TABLET ORAL 2 TIMES DAILY
Qty: 60 TABLET | Refills: 0 | Status: SHIPPED | OUTPATIENT
Start: 2017-09-13 | End: 2017-10-25

## 2017-09-13 RX ORDER — ZOLPIDEM TARTRATE 6.25 MG/1
6.25 TABLET, FILM COATED, EXTENDED RELEASE ORAL
Qty: 60 TABLET | Refills: 0 | Status: SHIPPED | OUTPATIENT
Start: 2017-09-13 | End: 2017-11-29

## 2017-09-13 NOTE — TELEPHONE ENCOUNTER
Faxed Ambien and brought down Adderall RX down to [facility], left V/M for pt as well letting her know

## 2017-10-25 DIAGNOSIS — F90.1 ATTENTION-DEFICIT HYPERACTIVITY DISORDER, PREDOMINANTLY HYPERACTIVE TYPE: ICD-10-CM

## 2017-10-25 RX ORDER — DEXTROAMPHETAMINE SACCHARATE, AMPHETAMINE ASPARTATE, DEXTROAMPHETAMINE SULFATE AND AMPHETAMINE SULFATE 5; 5; 5; 5 MG/1; MG/1; MG/1; MG/1
20 TABLET ORAL 2 TIMES DAILY
Qty: 60 TABLET | Refills: 0 | Status: SHIPPED | OUTPATIENT
Start: 2017-10-25 | End: 2017-11-29

## 2017-10-25 NOTE — TELEPHONE ENCOUNTER
Reason for Call:  Medication or medication refill:    Do you use a [facility]?  Name of the pharmacy and phone number for the current request[facility] ([place] - [phone]    Name of the medication requested: adderall    Other request: none    Can we leave a detailed message on this number? YES    Phone number patient can be reached at: Home number on file [phone] (home)    Best Time: any    Call taken on 10/25/2017 at 8:22 AM by Milly Loyola

## 2017-10-25 NOTE — TELEPHONE ENCOUNTER
Fill at [facility].    Adderall 20mg      Last Written Prescription Date:  9/13/17  Last Fill Quantity: 60,   # refills: 0  Future Office visit:       Signed CSA on file.      Routing refill request to provider for review/approval because:  Drug not on the [facility], [facility] or [facility] refill protocol or controlled substance

## 2017-11-29 ENCOUNTER — TELEPHONE (OUTPATIENT)
Dept: INTERNAL MEDICINE | Facility: CLINIC | Age: 32
End: 2017-11-29

## 2017-11-29 DIAGNOSIS — F51.01 PRIMARY INSOMNIA: ICD-10-CM

## 2017-11-29 DIAGNOSIS — F90.1 ATTENTION-DEFICIT HYPERACTIVITY DISORDER, PREDOMINANTLY HYPERACTIVE TYPE: ICD-10-CM

## 2017-11-29 RX ORDER — DEXTROAMPHETAMINE SACCHARATE, AMPHETAMINE ASPARTATE, DEXTROAMPHETAMINE SULFATE AND AMPHETAMINE SULFATE 5; 5; 5; 5 MG/1; MG/1; MG/1; MG/1
20 TABLET ORAL 2 TIMES DAILY
Qty: 60 TABLET | Refills: 0 | Status: SHIPPED | OUTPATIENT
Start: 2017-11-29 | End: 2018-01-03

## 2017-11-29 RX ORDER — ZOLPIDEM TARTRATE 6.25 MG/1
6.25 TABLET, FILM COATED, EXTENDED RELEASE ORAL
Qty: 60 TABLET | Refills: 0 | Status: SHIPPED | OUTPATIENT
Start: 2017-11-29 | End: 2018-01-03

## 2017-11-29 NOTE — TELEPHONE ENCOUNTER
Adderall, Ambien      Last Written Prescription Date:  10/25/17, 09/13/17  Last Fill Quantity: 60 (both),   # refills: 0 (both)  Last Office Visit: 01/10/17  Future Office visit:       Routing refill request to provider for review/approval because:  Drug not on the [facility], [facility] or  Health refill protocol or controlled substance    CSA in epic. Rxs to [facility] when available.    RX monitoring program ([place]MP) reviewed:  reviewed- no concerns    [place]MP profile:  [email or web address]    Please advise, thanks.

## 2017-11-29 NOTE — TELEPHONE ENCOUNTER
(Reason for Call:  Medication or medication refill:    Do you use a [facility]?  Name of the pharmacy and phone number for the current request:  [facility] [address] [facility] [address] [place] - [phone]    Name of the medication requested: Adderall and Ambien    Other request: no    Can we leave a detailed message on this number? YES    Phone number patient can be reached at: Cell number on file:    Telephone Information:   Mobile [phone]       Best Time: any    Call taken on 11/29/2017 at 10:50 AM by Fiona Cuellar

## 2018-01-03 ENCOUNTER — TELEPHONE (OUTPATIENT)
Dept: INTERNAL MEDICINE | Facility: CLINIC | Age: 33
End: 2018-01-03

## 2018-01-03 DIAGNOSIS — F90.1 ATTENTION-DEFICIT HYPERACTIVITY DISORDER, PREDOMINANTLY HYPERACTIVE TYPE: ICD-10-CM

## 2018-01-03 DIAGNOSIS — F51.01 PRIMARY INSOMNIA: ICD-10-CM

## 2018-01-03 RX ORDER — ZOLPIDEM TARTRATE 6.25 MG/1
6.25 TABLET, FILM COATED, EXTENDED RELEASE ORAL
Qty: 60 TABLET | Refills: 0 | Status: SHIPPED | OUTPATIENT
Start: 2018-01-03 | End: 2018-05-08

## 2018-01-03 RX ORDER — DEXTROAMPHETAMINE SACCHARATE, AMPHETAMINE ASPARTATE, DEXTROAMPHETAMINE SULFATE AND AMPHETAMINE SULFATE 5; 5; 5; 5 MG/1; MG/1; MG/1; MG/1
20 TABLET ORAL 2 TIMES DAILY
Qty: 60 TABLET | Refills: 0 | Status: SHIPPED | OUTPATIENT
Start: 2018-01-03 | End: 2018-02-13

## 2018-01-03 NOTE — TELEPHONE ENCOUNTER
Left V/M for pt letting her know that I [facility]

## 2018-01-03 NOTE — TELEPHONE ENCOUNTER
Pt left voice message at 1056 requesting refills for Adderall and Ambien.     Fill at [facility].     Adderall 20mg; Ambien CR 6.25mg      Last Written Prescription Date:  11/29/17  Last Fill Quantity: 60,   # refills: 0  Last Office Visit: 1/10/17  Future Office visit:       Signed CSA on file.     RX monitoring program ([place]MP) reviewed:  not reviewed/not due - last done on 11/29/17    MNPMP profile:  [email or web address]     Routing refill request to provider for review/approval because:  Drug not on the [facility], [facility] or  Health refill protocol or controlled substance     Left voice message for pt that she is due for office visit. Advised pt to call to schedule an appt.

## 2018-01-17 ENCOUNTER — TELEPHONE (OUTPATIENT)
Dept: INTERNAL MEDICINE | Facility: CLINIC | Age: 33
End: 2018-01-17

## 2018-01-17 DIAGNOSIS — F51.01 PRIMARY INSOMNIA: ICD-10-CM

## 2018-01-17 RX ORDER — ZOLPIDEM TARTRATE 6.25 MG/1
6.25 TABLET, FILM COATED, EXTENDED RELEASE ORAL
Qty: 60 TABLET | Refills: 0 | Status: CANCELLED | OUTPATIENT
Start: 2018-01-17

## 2018-01-17 NOTE — TELEPHONE ENCOUNTER
Prior Authorization Retail Medication Request  Medication/Dose: zolpidem 6.25mg  Diagnosis and ICD code: F51.01  New/Renewal/Insurance Change PA: new  Previously Tried and Failed Therapies: 5mg tabs    Insurance ID (if provided): [id number]  Insurance Phone (if provided): [phone]    Any additional info from fax request:     If you received a fax notification from an outside Pharmacy:  Pharmacy Name:[facility]   Pharmacy #:[phone]  Pharmacy Fax:[phone]

## 2018-01-18 ENCOUNTER — ALLIED HEALTH/NURSE VISIT (OUTPATIENT)
Dept: NURSING | Facility: CLINIC | Age: 33
End: 2018-01-18
Payer: MEDICAID

## 2018-01-18 VITALS
DIASTOLIC BLOOD PRESSURE: 60 MMHG | BODY MASS INDEX: 26.07 KG/M2 | WEIGHT: 152.7 LBS | HEIGHT: 64 IN | SYSTOLIC BLOOD PRESSURE: 104 MMHG

## 2018-01-18 DIAGNOSIS — Z30.42 ENCOUNTER FOR SURVEILLANCE OF INJECTABLE CONTRACEPTIVE: Primary | ICD-10-CM

## 2018-01-18 LAB — HCG, QUAL URINE: NEGATIVE

## 2018-01-18 PROCEDURE — 96372 THER/PROPH/DIAG INJ SC/IM: CPT

## 2018-01-18 PROCEDURE — 99207 ZZC NO CHARGE NURSE ONLY: CPT

## 2018-01-18 PROCEDURE — 84703 CHORIONIC GONADOTROPIN ASSAY: CPT

## 2018-01-18 NOTE — NURSING NOTE
Pt was late for depo injection. Dr. Villagomez said if pt has not had unprotected sex to do a UPT and if it is negative to administer the injection. Pt stated she has not had unprotected sex with a male. UPT completed with negative result. Depo given. Pt advised to make an annual exam office visit with Dr. Villagomez before next injection is due.    BP: 104/60    LAST PAP/EXAM:   Lab Results   Component Value Date    PAP NIL 08/17/2015     URINE HCG:negative    The following medication was given:     MEDICATION: Depo Provera 150mg  ROUTE: IM  SITE: [facility]  : [employer]  LOT #: [id number]  EXP:5/19  NEXT INJECTION DUE: 4/5/18 - 4/19/18   Provider: Dr. Villagomez

## 2018-01-18 NOTE — PROGRESS NOTES
Follow Up Injection    Patient returning during stated date range given at previous visit: No, urine pregnancy test performed, results: neg - injection administered    If here at the correct interval:   BP Readings from Last 1 Encounters:   07/19/17 102/80     Wt Readings from Last 1 Encounters:   07/19/17 143 lb (64.9 kg)       Last Pap/exam date: 8/17/15      Side effects or problems with last injection?  No.  Date range is given to patient for next dose: 4/11/18--pt advised to make annual appointment with provider    See Medication Note for administration information    Staff Sig: Fabiana Coleman CMA

## 2018-01-18 NOTE — MR AVS SNAPSHOT
"              After Visit Summary   2018    Melissa Benavidez    MRN: [id number]           Patient Information     Date Of Birth          1985        Visit Information        Provider Department      2018 2:45 PM [place] OB NURSE [facility]        Today's Diagnoses     Encounter for surveillance of injectable contraceptive    -  1       Follow-ups after your visit        Who to contact     If you have questions or need follow up information about today's clinic visit or your schedule please contact [facility] directly at [phone].  Normal or non-critical lab and imaging results will be communicated to you by Plastychart, letter or phone within 4 business days after the clinic has received the results. If you do not hear from us within 7 days, please contact the clinic through Plastychart or phone. If you have a critical or abnormal lab result, we will notify you by phone as soon as possible.  Submit refill requests through [employer] or call your pharmacy and they will forward the refill request to us. Please allow 3 business days for your refill to be completed.          Additional Information About Your Visit        MyChart Information     [employer] lets you send messages to your doctor, view your test results, renew your prescriptions, schedule appointments and more. To sign up, go to [email or web address] . Click on \"Log in\" on the left side of the screen, which will take you to the Welcome page. Then click on \"Sign up Now\" on the right side of the page.     You will be asked to enter the access code listed below, as well as some personal information. Please follow the directions to create your username and password.     Your access code is: [email or web address]  Expires: 2018  3:44 PM     Your access code will  in 90 days. If you need help or a new code, please call your [facility] or [phone].        Care EveryWhere ID     This is your Care EveryWhere ID. " "This could be used by other organizations to access your [place] medical records  [id number]        Your Vitals Were     Height BMI (Body Mass Index)                5' 4\" (1.626 m) 26.21 kg/m2           Blood Pressure from Last 3 Encounters:   01/18/18 104/60   07/19/17 102/80   04/11/17 132/70    Weight from Last 3 Encounters:   01/18/18 152 lb 11.2 oz (69.3 kg)   07/19/17 143 lb (64.9 kg)   04/11/17 149 lb 4.8 oz (67.7 kg)              We Performed the Following     C Medroxyprogesterone inj/1mg (J-Code)     HCL HCG, URINE, NURSE BACKOFFICE     THER/PROPH/DIAG INJ, SC/IM        Primary Care Provider Office Phone # Fax #    Franca Mandujano [phone]       [address] [facility] [address]        Equal Access to Services     ELISE JEFFERSON : Hadii aad ku hadasho Soomaali, waaxda luqadaha, qaybta kaalmada adeegyada, waxay idiin hayaan christo urena . So [facility] [phone].    ATENCIÓN: Si federicola español, tiene a matthews disposición servicios gratuitos de asistencia lingüística. Llame al [phone].    We comply with applicable federal civil rights laws and [place] laws. We do not discriminate on the basis of race, color, national origin, age, disability, sex, sexual orientation, or gender identity.            Thank you!     Thank you for choosing [facility]  for your care. Our goal is always to provide you with excellent care. Hearing back from our patients is one way we can continue to improve our services. Please take a few minutes to complete the written survey that you may receive in the mail after your visit with us. Thank you!             Your Updated Medication List - Protect others around you: Learn how to safely use, store and throw away your medicines at www.disposemymeds.org.          This list is accurate as of: 1/18/18  3:44 PM.  Always use your most recent med list.                   Brand Name Dispense Instructions for use Diagnosis    " amphetamine-dextroamphetamine 20 MG per tablet    ADDERALL    60 tablet    Take 1 tablet (20 mg) by mouth 2 times daily    Attention-deficit hyperactivity disorder, predominantly hyperactive type       buPROPion 150 MG 24 hr tablet    WELLBUTRIN XL    90 tablet    TAKE 1 TABLET (150 MG) BY MOUTH EVERY MORNING    Anxiety       FLUoxetine 10 MG capsule    PROzac    90 capsule    TAKE 1 CAPSULE (10 MG) BY MOUTH DAILY    Anxiety       ibuprofen 200 MG tablet    ADVIL/MOTRIN    30 tablet    Take 3 tablets by mouth every 8 hours as needed for pain.        * medroxyPROGESTERone 150 MG/ML injection    DEPO-PROVERA    1 mL    Inject 1 mL (150 mg) into the muscle every 3 months    Unspecified contraceptive management       * medroxyPROGESTERone 150 MG/ML injection    DEPO-PROVERA    3 mL    Inject 1 mL (150 mg) into the muscle every 3 months    Encounter for initial prescription of injectable contraceptive       omeprazole 20 MG CR capsule    priLOSEC    30 capsule    Take 1 capsule (20 mg) by mouth daily    Gastroesophageal reflux disease without esophagitis       zolpidem 6.25 MG CR tablet    AMBIEN CR    60 tablet    Take 1 tablet (6.25 mg) by mouth nightly as needed for sleep    Primary insomnia       * Notice:  This list has 2 medication(s) that are the same as other medications prescribed for you. Read the directions carefully, and ask your doctor or other care provider to review them with you.

## 2018-02-13 DIAGNOSIS — F90.1 ATTENTION-DEFICIT HYPERACTIVITY DISORDER, PREDOMINANTLY HYPERACTIVE TYPE: ICD-10-CM

## 2018-02-13 RX ORDER — DEXTROAMPHETAMINE SACCHARATE, AMPHETAMINE ASPARTATE, DEXTROAMPHETAMINE SULFATE AND AMPHETAMINE SULFATE 5; 5; 5; 5 MG/1; MG/1; MG/1; MG/1
20 TABLET ORAL 2 TIMES DAILY
Qty: 60 TABLET | Refills: 0 | Status: SHIPPED | OUTPATIENT
Start: 2018-02-13 | End: 2018-03-15

## 2018-02-13 NOTE — TELEPHONE ENCOUNTER
Zolpidem CR 6.25mg requires PA. Per 1/17/18 telephone encounter, our office attempted to contact pt to find out what is covered, but could not reach her and encounter was closed.     Contacted pt. Left voice message that she is due for appt with Dr. Mandujano. Also asked her to check with insurance on alternatives to Zolpidem CR 6.25mg and call back with this information.     Fill at [facility].  Adderall 20mg      Last Written Prescription Date:  1/3/18  Last Fill Quantity: 60,   # refills: 0  Last Office Visit: 1/10/17 (last appt with Dr. Mandujano 6/6/16)  Future Office visit:       Signed CSA on file.     RX monitoring program ([place]NPMP) reviewed:  reviewed- no concerns    MNPMP profile:  [email or web address]   Routing refill request to provider for review/approval because:  Drug not on the [facility], [facility] or  Health refill protocol or controlled substance

## 2018-02-13 NOTE — TELEPHONE ENCOUNTER
Reason for Call:  Medication or medication refill:    Do you use a [facility]?  Name of the pharmacy and phone number for the current request:  [facility] [address] ([place]) - [phone]    Name of the medication requested: adderall 20 mg    Other request: Pt was wondering about her ambien if it got approved yet?    Can we leave a detailed message on this number? YES    Phone number patient can be reached at: Home number on file [phone] (home)    Best Time: any    Call taken on 2/13/2018 at 10:49 AM by Milly Loyola

## 2018-03-15 ENCOUNTER — TELEPHONE (OUTPATIENT)
Dept: INTERNAL MEDICINE | Facility: CLINIC | Age: 33
End: 2018-03-15

## 2018-03-15 DIAGNOSIS — F90.1 ATTENTION-DEFICIT HYPERACTIVITY DISORDER, PREDOMINANTLY HYPERACTIVE TYPE: ICD-10-CM

## 2018-03-15 NOTE — TELEPHONE ENCOUNTER
[facility]    Adderall     Last Written Prescription Date:  2-13-18  Last Fill Quantity: 60,   # refills: 0  Last Office Visit: 1-10-17 with Dr. Vazquez, 6-6-16 with Dr. Mandujano  Future Office visit:       Routing refill request to provider for review/approval because:  Drug not on the G, P or [facility] refill protocol or controlled substance    Signed CSA form in chart.    Please advise, thanks.    No future appt scheduled.

## 2018-03-15 NOTE — TELEPHONE ENCOUNTER
Reason for Call:  Medication or medication refill:    Do you use a [facility]?  Name of the pharmacy and phone number for the current request[facility] [address] ([place]) - [phone]    Name of the medication requested: adderall    Other request: none    Can we leave a detailed message on this number? YES    Phone number patient can be reached at: Home number on file [phone] (home)    Best Time: any    Call taken on 3/15/2018 at 3:22 PM by Milly Loyola

## 2018-03-16 RX ORDER — DEXTROAMPHETAMINE SACCHARATE, AMPHETAMINE ASPARTATE, DEXTROAMPHETAMINE SULFATE AND AMPHETAMINE SULFATE 5; 5; 5; 5 MG/1; MG/1; MG/1; MG/1
20 TABLET ORAL 2 TIMES DAILY
Qty: 60 TABLET | Refills: 0 | Status: SHIPPED | OUTPATIENT
Start: 2018-03-16 | End: 2018-04-25

## 2018-04-10 ENCOUNTER — TELEPHONE (OUTPATIENT)
Dept: INTERNAL MEDICINE | Facility: CLINIC | Age: 33
End: 2018-04-10

## 2018-04-10 DIAGNOSIS — Z79.899 CONTROLLED SUBSTANCE AGREEMENT SIGNED: Primary | ICD-10-CM

## 2018-04-10 DIAGNOSIS — F90.1 ATTENTION-DEFICIT HYPERACTIVITY DISORDER, PREDOMINANTLY HYPERACTIVE TYPE: ICD-10-CM

## 2018-04-10 RX ORDER — DEXTROAMPHETAMINE SACCHARATE, AMPHETAMINE ASPARTATE, DEXTROAMPHETAMINE SULFATE AND AMPHETAMINE SULFATE 5; 5; 5; 5 MG/1; MG/1; MG/1; MG/1
20 TABLET ORAL 2 TIMES DAILY
Qty: 60 TABLET | Refills: 0 | Status: CANCELLED | OUTPATIENT
Start: 2018-04-10

## 2018-04-10 NOTE — TELEPHONE ENCOUNTER
Fill at [facility].  Left voice message for pt to schedule appt for medication follow up with Dr. Mandujano.    Requested Prescriptions   Pending Prescriptions Disp Refills     amphetamine-dextroamphetamine (ADDERALL) 20 MG per tablet  Last Written Prescription Date:  3/16/18  Last Fill Quantity: 60,  # refills: 0   Last office visit: 1/10/2017 with Dr. Vazquez; 6/6/16 with Dr. Mandujano   Future Office Visit:     60 tablet 0     Sig: Take 1 tablet (20 mg) by mouth 2 times daily    There is no refill protocol information for this order    Signed CSA on file.      RX monitoring program ([place]NPMP) reviewed:  reviewed- no concerns    MNPMP profile:  [email or web address]     Routing refill request to provider for review/approval because:  Controlled substance

## 2018-04-10 NOTE — TELEPHONE ENCOUNTER
Reason for Call:  Medication or medication refill:Med Refill    Do you use a [facility]?  Name of the pharmacy and phone number for the current request[facility] [address] ([place]) - [phone]    Name of the medication requested: amphetamine-dextroamphetamine (ADDERALL) 20 MG per tablet    Other request: Approx 1 week lefr    Can we leave a detailed message on this number? YES    Phone number patient can be reached at: Cell number on file:    Telephone Information:   Mobile [phone]       Best Time: anytime    Call taken on 4/10/2018 at 8:49 AM by KELLEN HOLLAND

## 2018-04-24 NOTE — TELEPHONE ENCOUNTER
Pt scheduled 5/8 Med Check appt.  Will be out of medication tomorrow.  Requesting enough to make it to Med Check appt.

## 2018-04-25 RX ORDER — DEXTROAMPHETAMINE SACCHARATE, AMPHETAMINE ASPARTATE, DEXTROAMPHETAMINE SULFATE AND AMPHETAMINE SULFATE 5; 5; 5; 5 MG/1; MG/1; MG/1; MG/1
20 TABLET ORAL 2 TIMES DAILY
Qty: 30 TABLET | Refills: 0 | Status: SHIPPED | OUTPATIENT
Start: 2018-04-25 | End: 2018-05-08

## 2018-05-08 ENCOUNTER — OFFICE VISIT (OUTPATIENT)
Dept: INTERNAL MEDICINE | Facility: CLINIC | Age: 33
End: 2018-05-08
Payer: COMMERCIAL

## 2018-05-08 ENCOUNTER — OFFICE VISIT (OUTPATIENT)
Dept: BEHAVIORAL HEALTH | Facility: CLINIC | Age: 33
End: 2018-05-08

## 2018-05-08 VITALS
DIASTOLIC BLOOD PRESSURE: 70 MMHG | OXYGEN SATURATION: 98 % | BODY MASS INDEX: 24.24 KG/M2 | HEIGHT: 64 IN | HEART RATE: 90 BPM | TEMPERATURE: 98.5 F | WEIGHT: 142 LBS | SYSTOLIC BLOOD PRESSURE: 120 MMHG

## 2018-05-08 DIAGNOSIS — F90.1 ATTENTION-DEFICIT HYPERACTIVITY DISORDER, PREDOMINANTLY HYPERACTIVE TYPE: ICD-10-CM

## 2018-05-08 DIAGNOSIS — R69 DIAGNOSIS DEFERRED: Primary | ICD-10-CM

## 2018-05-08 DIAGNOSIS — F51.01 PRIMARY INSOMNIA: ICD-10-CM

## 2018-05-08 DIAGNOSIS — F41.9 ANXIETY: ICD-10-CM

## 2018-05-08 PROCEDURE — 99214 OFFICE O/P EST MOD 30 MIN: CPT | Performed by: INTERNAL MEDICINE

## 2018-05-08 PROCEDURE — 99207 ZZC NO CHARGE LOS: CPT

## 2018-05-08 RX ORDER — BUPROPION HYDROCHLORIDE 150 MG/1
TABLET ORAL
Qty: 90 TABLET | Refills: 1 | Status: SHIPPED | OUTPATIENT
Start: 2018-05-08 | End: 2020-04-17

## 2018-05-08 RX ORDER — ZOLPIDEM TARTRATE 6.25 MG/1
6.25 TABLET, FILM COATED, EXTENDED RELEASE ORAL
Qty: 60 TABLET | Refills: 0 | Status: SHIPPED | OUTPATIENT
Start: 2018-05-08 | End: 2018-07-27

## 2018-05-08 RX ORDER — DEXTROAMPHETAMINE SACCHARATE, AMPHETAMINE ASPARTATE, DEXTROAMPHETAMINE SULFATE AND AMPHETAMINE SULFATE 5; 5; 5; 5 MG/1; MG/1; MG/1; MG/1
20 TABLET ORAL 2 TIMES DAILY
Qty: 30 TABLET | Refills: 0 | Status: SHIPPED | OUTPATIENT
Start: 2018-05-08 | End: 2018-05-22

## 2018-05-08 RX ORDER — FLUOXETINE 10 MG/1
CAPSULE ORAL
Qty: 90 CAPSULE | Refills: 1 | Status: SHIPPED | OUTPATIENT
Start: 2018-05-08 | End: 2020-06-11

## 2018-05-08 ASSESSMENT — ANXIETY QUESTIONNAIRES
IF YOU CHECKED OFF ANY PROBLEMS ON THIS QUESTIONNAIRE, HOW DIFFICULT HAVE THESE PROBLEMS MADE IT FOR YOU TO DO YOUR WORK, TAKE CARE OF THINGS AT HOME, OR GET ALONG WITH OTHER PEOPLE: SOMEWHAT DIFFICULT
7. FEELING AFRAID AS IF SOMETHING AWFUL MIGHT HAPPEN: NOT AT ALL
1. FEELING NERVOUS, ANXIOUS, OR ON EDGE: SEVERAL DAYS
6. BECOMING EASILY ANNOYED OR IRRITABLE: MORE THAN HALF THE DAYS
2. NOT BEING ABLE TO STOP OR CONTROL WORRYING: SEVERAL DAYS
GAD7 TOTAL SCORE: 7
5. BEING SO RESTLESS THAT IT IS HARD TO SIT STILL: NOT AT ALL
3. WORRYING TOO MUCH ABOUT DIFFERENT THINGS: MORE THAN HALF THE DAYS

## 2018-05-08 ASSESSMENT — PATIENT HEALTH QUESTIONNAIRE - PHQ9: 5. POOR APPETITE OR OVEREATING: SEVERAL DAYS

## 2018-05-08 NOTE — PROGRESS NOTES
"  SUBJECTIVE:   Melissa Benavidez is a 32 year old female who presents to clinic today for the following health issues:      Depression and Anxiety Follow-Up    Status since last visit: No change    Other associated symptoms:None    Complicating factors:     Significant life event: No     Current substance abuse: Alcohol 1/week    PHQ-9 11/24/2015 6/6/2016   Total Score 12 5   Q9: Suicide Ideation Not at all Not at all     RASHARD-7 SCORE 9/25/2014 11/24/2015 6/6/2016   Total Score 20 - -   Total Score - 10 4     Insomnia. She has tried ambien, which has helped. Patient does not exercise regularly. No caffeine after noon time.     ADHD. Patient reports that the current dose of medication is working well.       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      Reviewed and updated as needed this visit by clinical staff  Allergies       Reviewed and updated as needed this visit by Provider         ROS:  CONSTITUTIONAL: NEGATIVE for fever, chills, change in weight  RESP: NEGATIVE for significant cough or SOB  CV: NEGATIVE for chest pain, palpitations or peripheral edema  Psych: POS anxiety and depression    OBJECTIVE:     /70 (BP Location: Right arm, Cuff Size: Adult Regular)  Pulse 90  Temp 98.5  F (36.9  C) (Oral)  Ht 5' 4\" (1.626 m)  Wt 142 lb (64.4 kg)  SpO2 98%  BMI 24.37 kg/m2  Body mass index is 24.37 kg/(m^2).  GENERAL: healthy, alert and no distress  RESP: lungs clear to auscultation - no rales, rhonchi or wheezes  CV: regular rate and rhythm, normal S1 S2, no S3 or S4, no murmur  Psych: normal affect    ASSESSMENT/PLAN:       (F90.1) Attention-deficit hyperactivity disorder, " predominantly hyperactive type  Comment: stable  Plan: amphetamine-dextroamphetamine (ADDERALL) 20 MG         per tablet            (F41.9) Anxiety  Comment: stable  Plan: buPROPion (WELLBUTRIN XL) 150 MG 24 hr tablet,         FLUoxetine (PROZAC) 10 MG capsule            (F51.01) Primary insomnia  Comment: reports ambien works well  Plan: zolpidem (AMBIEN CR) 6.25 MG CR tablet        -counseled on exercise and sleep hygeine    -counseled patient to schedule a physical around time when pap smear is due    Franca Mandujano MD  [facility]

## 2018-05-08 NOTE — MR AVS SNAPSHOT
"              After Visit Summary   2018    Melissa Benavidez    MRN: [id number]           Patient Information     Date Of Birth          1985        Visit Information        Provider Department      2018 3:40 PM Franca Mandujano MD [facility]        Today's Diagnoses     Attention-deficit hyperactivity disorder, predominantly hyperactive type        Anxiety        Primary insomnia          Care Instructions    150 minutes per week              Follow-ups after your visit        Who to contact     If you have questions or need follow up information about today's clinic visit or your schedule please contact [facility] directly at [phone].  Normal or non-critical lab and imaging results will be communicated to you by Pristoneshart, letter or phone within 4 business days after the clinic has received the results. If you do not hear from us within 7 days, please contact the clinic through Pristoneshart or phone. If you have a critical or abnormal lab result, we will notify you by phone as soon as possible.  Submit refill requests through Dashbell or call your pharmacy and they will forward the refill request to us. Please allow 3 business days for your refill to be completed.          Additional Information About Your Visit        MyChart Information     Dashbell lets you send messages to your doctor, view your test results, renew your prescriptions, schedule appointments and more. To sign up, go to [email or web address] . Click on \"Log in\" on the left side of the screen, which will take you to the Welcome page. Then click on \"Sign up Now\" on the right side of the page.     You will be asked to enter the access code listed below, as well as some personal information. Please follow the directions to create your username and password.     Your access code is: [email or web address]  Expires: 2018  4:33 PM     Your access code will  in 90 days. If you need help or a new code, " "please call your [facility] clinic or [phone].        Care EveryWhere ID     This is your Care EveryWhere ID. This could be used by other organizations to access your [facility] medical records  [id number]        Your Vitals Were     Pulse Temperature Height Pulse Oximetry BMI (Body Mass Index)       90 98.5  F (36.9  C) (Oral) 5' 4\" (1.626 m) 98% 24.37 kg/m2        Blood Pressure from Last 3 Encounters:   05/08/18 120/70   01/18/18 104/60   07/19/17 102/80    Weight from Last 3 Encounters:   05/08/18 142 lb (64.4 kg)   01/18/18 152 lb 11.2 oz (69.3 kg)   07/19/17 143 lb (64.9 kg)              Today, you had the following     No orders found for display         Today's Medication Changes          These changes are accurate as of 5/8/18  4:33 PM.  If you have any questions, ask your nurse or doctor.               These medicines have changed or have updated prescriptions.        Dose/Directions    FLUoxetine 10 MG capsule   Commonly known as:  PROzac   This may have changed:  See the new instructions.   Used for:  Anxiety   Changed by:  Franca Mandujano MD        TAKE 1 CAPSULE (10 MG) BY MOUTH DAILY   Quantity:  90 capsule   Refills:  1            Where to get your medicines      These medications were sent to [facility] [place], [place] - [facility] [address]  [facility] [address] [facility] [address]     Phone:  [phone]     buPROPion 150 MG 24 hr tablet    FLUoxetine 10 MG capsule         Some of these will need a paper prescription and others can be bought over the counter.  Ask your nurse if you have questions.     Bring a paper prescription for each of these medications     amphetamine-dextroamphetamine 20 MG per tablet    zolpidem 6.25 MG CR tablet                Primary Care Provider Office Phone # Fax #    Franca Mandujano [phone]       [facility] [address]  [facility] [address]        Equal Access to Services     ELISE JEFFERSON AH: Casie hare " Michaelle, yu luromuloadaha, qajoseta kabrenda castro, brad asifin hayaan jankineri eliernick laRinacarmen ashlyn. So [place] [phone].    ATENCIÓN: Si man bergman, tiene a matthews disposición servicios gratuitos de asistencia lingüística. Kasia al [phone].    We comply with applicable federal civil rights laws and [place] laws. We do not discriminate on the basis of race, color, national origin, age, disability, sex, sexual orientation, or gender identity.            Thank you!     Thank you for choosing [facility]  for your care. Our goal is always to provide you with excellent care. Hearing back from our patients is one way we can continue to improve our services. Please take a few minutes to complete the written survey that you may receive in the mail after your visit with us. Thank you!             Your Updated Medication List - Protect others around you: Learn how to safely use, store and throw away your medicines at www.disposemymeds.org.          This list is accurate as of 5/8/18  4:33 PM.  Always use your most recent med list.                   Brand Name Dispense Instructions for use Diagnosis    amphetamine-dextroamphetamine 20 MG per tablet    ADDERALL    30 tablet    Take 1 tablet (20 mg) by mouth 2 times daily    Attention-deficit hyperactivity disorder, predominantly hyperactive type       buPROPion 150 MG 24 hr tablet    WELLBUTRIN XL    90 tablet    TAKE 1 TABLET (150 MG) BY MOUTH EVERY MORNING    Anxiety       FLUoxetine 10 MG capsule    PROzac    90 capsule    TAKE 1 CAPSULE (10 MG) BY MOUTH DAILY    Anxiety       ibuprofen 200 MG tablet    ADVIL/MOTRIN    30 tablet    Take 3 tablets by mouth every 8 hours as needed for pain.        * medroxyPROGESTERone 150 MG/ML injection    DEPO-PROVERA    1 mL    Inject 1 mL (150 mg) into the muscle every 3 months    Unspecified contraceptive management       * medroxyPROGESTERone 150 MG/ML injection    DEPO-PROVERA    3 mL    Inject 1 mL (150 mg) into  the muscle every 3 months    Encounter for initial prescription of injectable contraceptive       omeprazole 20 MG CR capsule    priLOSEC    30 capsule    Take 1 capsule (20 mg) by mouth daily    Gastroesophageal reflux disease without esophagitis       zolpidem 6.25 MG CR tablet    AMBIEN CR    60 tablet    Take 1 tablet (6.25 mg) by mouth nightly as needed for sleep    Primary insomnia       * Notice:  This list has 2 medication(s) that are the same as other medications prescribed for you. Read the directions carefully, and ask your doctor or other care provider to review them with you.

## 2018-05-08 NOTE — MR AVS SNAPSHOT
"              After Visit Summary   2018    Melissa Benavidez    MRN: [id number]           Patient Information     Date Of Birth          1985        Visit Information        Provider Department      2018 3:45 PM Marta Pennington [facility]        Today's Diagnoses     Diagnosis deferred    -  1       Follow-ups after your visit        Who to contact     If you have questions or need follow up information about today's clinic visit or your schedule please contact [facility] directly at [phone].  Normal or non-critical lab and imaging results will be communicated to you by MyChart, letter or phone within 4 business days after the clinic has received the results. If you do not hear from us within 7 days, please contact the clinic through Alfredhart or phone. If you have a critical or abnormal lab result, we will notify you by phone as soon as possible.  Submit refill requests through Whisbi or call your pharmacy and they will forward the refill request to us. Please allow 3 business days for your refill to be completed.          Additional Information About Your Visit        MyChart Information     Whisbi lets you send messages to your doctor, view your test results, renew your prescriptions, schedule appointments and more. To sign up, go to [email or web address] . Click on \"Log in\" on the left side of the screen, which will take you to the Welcome page. Then click on \"Sign up Now\" on the right side of the page.     You will be asked to enter the access code listed below, as well as some personal information. Please follow the directions to create your username and password.     Your access code is: [email or web address]  Expires: 2018  4:33 PM     Your access code will  in 90 days. If you need help or a new code, please call your [facility] or [phone].        Care EveryWhere ID     This is your Care EveryWhere ID. This could be used by other " organizations to access your [facility] medical records  [id number]         Blood Pressure from Last 3 Encounters:   05/08/18 120/70   01/18/18 104/60   07/19/17 102/80    Weight from Last 3 Encounters:   05/08/18 64.4 kg (142 lb)   01/18/18 69.3 kg (152 lb 11.2 oz)   07/19/17 64.9 kg (143 lb)              Today, you had the following     No orders found for display         Today's Medication Changes          These changes are accurate as of 5/8/18 11:59 PM.  If you have any questions, ask your nurse or doctor.               These medicines have changed or have updated prescriptions.        Dose/Directions    FLUoxetine 10 MG capsule   Commonly known as:  PROzac   This may have changed:  See the new instructions.   Used for:  Anxiety   Changed by:  Franca Mandujano MD        TAKE 1 CAPSULE (10 MG) BY MOUTH DAILY   Quantity:  90 capsule   Refills:  1            Where to get your medicines      These medications were sent to [facility] [place] [address]  [facility] [address] [facility] [address]     Phone:  [phone]     buPROPion 150 MG 24 hr tablet    FLUoxetine 10 MG capsule         Some of these will need a paper prescription and others can be bought over the counter.  Ask your nurse if you have questions.     Bring a paper prescription for each of these medications     amphetamine-dextroamphetamine 20 MG per tablet    zolpidem 6.25 MG CR tablet                Primary Care Provider Office Phone # Fax #    Franca Mandujano [phone]       [facility] [address]  [facility] [address]        Equal Access to Services     [facility] : Hadii aad ku hadasho Soomaali, waaxda luqadaha, qaybta kaalmada adeegyada, brad muse haycarmen urena . So [facility] [phone].    ATENCIÓN: Si habla español, tiene a matthews disposición servicios gratuitos de asistencia lingüística. Llame al [phone].    We comply with applicable federal civil rights laws and  [place] laws. We do not discriminate on the basis of race, color, national origin, age, disability, sex, sexual orientation, or gender identity.            Thank you!     Thank you for choosing [facility]  for your care. Our goal is always to provide you with excellent care. Hearing back from our patients is one way we can continue to improve our services. Please take a few minutes to complete the written survey that you may receive in the mail after your visit with us. Thank you!             Your Updated Medication List - Protect others around you: Learn how to safely use, store and throw away your medicines at www.disposemymeds.org.          This list is accurate as of 5/8/18 11:59 PM.  Always use your most recent med list.                   Brand Name Dispense Instructions for use Diagnosis    amphetamine-dextroamphetamine 20 MG per tablet    ADDERALL    30 tablet    Take 1 tablet (20 mg) by mouth 2 times daily    Attention-deficit hyperactivity disorder, predominantly hyperactive type       buPROPion 150 MG 24 hr tablet    WELLBUTRIN XL    90 tablet    TAKE 1 TABLET (150 MG) BY MOUTH EVERY MORNING    Anxiety       FLUoxetine 10 MG capsule    PROzac    90 capsule    TAKE 1 CAPSULE (10 MG) BY MOUTH DAILY    Anxiety       ibuprofen 200 MG tablet    ADVIL/MOTRIN    30 tablet    Take 3 tablets by mouth every 8 hours as needed for pain.        * medroxyPROGESTERone 150 MG/ML injection    DEPO-PROVERA    1 mL    Inject 1 mL (150 mg) into the muscle every 3 months    Unspecified contraceptive management       * medroxyPROGESTERone 150 MG/ML injection    DEPO-PROVERA    3 mL    Inject 1 mL (150 mg) into the muscle every 3 months    Encounter for initial prescription of injectable contraceptive       omeprazole 20 MG CR capsule    priLOSEC    30 capsule    Take 1 capsule (20 mg) by mouth daily    Gastroesophageal reflux disease without esophagitis       zolpidem 6.25 MG CR tablet    AMBIEN CR    60  tablet    Take 1 tablet (6.25 mg) by mouth nightly as needed for sleep    Primary insomnia       * Notice:  This list has 2 medication(s) that are the same as other medications prescribed for you. Read the directions carefully, and ask your doctor or other care provider to review them with you.

## 2018-05-09 ASSESSMENT — PATIENT HEALTH QUESTIONNAIRE - PHQ9: SUM OF ALL RESPONSES TO PHQ QUESTIONS 1-9: 6

## 2018-05-09 ASSESSMENT — ANXIETY QUESTIONNAIRES: GAD7 TOTAL SCORE: 7

## 2018-05-09 NOTE — PROGRESS NOTES
Behavioral Health Home Services  No Data Recorded      Social Work Care Navigator Note      Patient: Melissa Benavidez  Date: May 9, 2018  Preferred Name: Melissa    Previous PHQ-9:   PHQ-9 SCORE 11/24/2015 6/6/2016 5/8/2018   Total Score - - -   Total Score 12 5 6     Previous RASHARD-7:   RASHARD-7 SCORE 11/24/2015 6/6/2016 5/8/2018   Total Score - - -   Total Score 10 4 7     DON LEVEL:  DON Score (Last Two) 12/12/2012   DON Raw Score 42   Activation Score 66   DON Level 3       Preferred Contact:  No Data Recorded    Type of Contact Today: Face to Face in Clinic      Data: (subjective / Objective):    [facility] Introduction:  Hi my name is Marta Pennington from your (name) primary care clinic.     I work closely with your primary care provider, Franca Mandujano.     If it's ok I'd like to talk about some new services available to you, at no out of pocket cost to you.      Before we get started can you verify your insurance for me?     What social work or case management services do you receive? (If so, are you receiving ACT or TCM?).  None at this time    Getting to Know You - Whole Person Care:  This new service is called Behavioral Health Home services, which is designed to support you as a whole person beyond just your medical needs.      Tell me about what types of things that are causing you stress OR impacting your quality of life?  housing    I'm here to be a central point of contact for your healthcare needs and to help with:    Housing    Transportation    Financial resources    Comprehensive Health needs (appointment help, medication costs, etc.)    Employment    Education    Health Insurance applications    And connecting with social supports or community resources    Out of the things I mentioned what would you find helpful?  Housing, financial      If patient does not have a Diagnostic Assessment -   We'll schedule you for an appointment with Jessica to do an assessment and then I'll meet with you briefly  afterwards to help you get enrolled.        Patient response to [facility] Service offering:   Patient enrolled in [facility] today Will call back in a day or so to get DA and Health and Wellness completed    Marta Pennington, Social Work Care Coordinator   ________________________________________________________________

## 2018-05-14 ENCOUNTER — TELEPHONE (OUTPATIENT)
Dept: BEHAVIORAL HEALTH | Facility: CLINIC | Age: 33
End: 2018-05-14

## 2018-05-14 NOTE — TELEPHONE ENCOUNTER
Behavioral Health Home Services  [facility]: [place]      Social Work Care Navigator Note      Patient: Melissa Benavidez  Date: May 14, 2018  Preferred Name: Melissa    Previous PHQ-9:   PHQ-9 SCORE 11/24/2015 6/6/2016 5/8/2018   Total Score - - -   Total Score 12 5 6     Previous RASHARD-7:   RASHARD-7 SCORE 11/24/2015 6/6/2016 5/8/2018   Total Score - - -   Total Score 10 4 7     DON LEVEL:  DON Score (Last Two) 12/12/2012   DON Raw Score 42   Activation Score 66   DON Level 3       Preferred Contact:  Need for : No  Preferred Contact: Cell    Type of Contact Today: Phone call (not reached/unavailable)      Data: (subjective / Objective): Need to make DA Appointment  Attempted to reach patient, but was unsuccessful.  Plan to attempt again.  Marta Pennington

## 2018-05-16 ENCOUNTER — TELEPHONE (OUTPATIENT)
Dept: BEHAVIORAL HEALTH | Facility: CLINIC | Age: 33
End: 2018-05-16

## 2018-05-16 NOTE — TELEPHONE ENCOUNTER
Behavioral Health Home Services  [facility]: [place]      Social Work Care Navigator Note      Patient: Melissa Benavidez  Date: May 16, 2018  Preferred Name: Melissa    Previous PHQ-9:   PHQ-9 SCORE 11/24/2015 6/6/2016 5/8/2018   Total Score - - -   Total Score 12 5 6     Previous RASHARD-7:   RASHARD-7 SCORE 11/24/2015 6/6/2016 5/8/2018   Total Score - - -   Total Score 10 4 7     DON LEVEL:  DON Score (Last Two) 12/12/2012   DON Raw Score 42   Activation Score 66   DON Level 3       Preferred Contact:  Need for : No  Preferred Contact: Cell    Type of Contact Today: Phone call (not reached/unavailable)      Data: (subjective / Objective):  Attempted to reach patient, but was unsuccessful.  Plan to attempt again.  Marta Pennington

## 2018-05-21 ENCOUNTER — TELEPHONE (OUTPATIENT)
Dept: BEHAVIORAL HEALTH | Facility: CLINIC | Age: 33
End: 2018-05-21

## 2018-05-21 NOTE — TELEPHONE ENCOUNTER
Behavioral Health Home Services  [facility]: [place]      Social Work Care Navigator Note      Patient: Melissa Benavidez  Date: May 21, 2018  Preferred Name: Melissa    Previous PHQ-9:   PHQ-9 SCORE 11/24/2015 6/6/2016 5/8/2018   Total Score - - -   Total Score 12 5 6     Previous RASHARD-7:   RASHARD-7 SCORE 11/24/2015 6/6/2016 5/8/2018   Total Score - - -   Total Score 10 4 7     DON LEVEL:  DON Score (Last Two) 12/12/2012   ODN Raw Score 42   Activation Score 66   DON Level 3       Preferred Contact:  Need for : No  Preferred Contact: Cell    Type of Contact Today: Phone call (not reached/unavailable)      Data: (subjective / Objective):  Attempted to reach patient, but was unsuccessful.  Plan to attempt again with a letter.  Marta Pennington

## 2018-05-22 DIAGNOSIS — Z79.899 CONTROLLED SUBSTANCE AGREEMENT SIGNED: Primary | ICD-10-CM

## 2018-05-22 DIAGNOSIS — F90.1 ATTENTION-DEFICIT HYPERACTIVITY DISORDER, PREDOMINANTLY HYPERACTIVE TYPE: ICD-10-CM

## 2018-05-22 NOTE — TELEPHONE ENCOUNTER
Reason for Call:  Medication or medication refill:    Do you use a [facility]?  [facility] the [facility] and phone [facility] the [facility] [address] ([place]) - [phone]    Name of the medication requested: adderall    Other request: none    Can we leave a detailed message on this number? YES    Phone number patient can be reached at: Home number on file [phone] (home)    Best Time: any    Call taken on 5/22/2018 at 3:20 PM by Milly Loyola

## 2018-05-22 NOTE — TELEPHONE ENCOUNTER
Patient was only given 15 day supply with last prescription. Can quantity be increased to 60 tabs for 30 days supply?    Fill at [facility].   Notify patient when dropped off.    Adderall 20 mg      Last Written Prescription Date:  5/8/18  Last Fill Quantity: 30,   # refills: 0  Last Office Visit: 5/8/18  Future Office visit:       Signed CSA on file.     RX [place] ([place]) reviewed:  reviewed- no concerns    [place]:  [email or web address]    Routing refill request to provider for review/approval because:  Drug not on the FMG, UMP or [facility] refill protocol or controlled substance

## 2018-05-23 RX ORDER — DEXTROAMPHETAMINE SACCHARATE, AMPHETAMINE ASPARTATE, DEXTROAMPHETAMINE SULFATE AND AMPHETAMINE SULFATE 5; 5; 5; 5 MG/1; MG/1; MG/1; MG/1
20 TABLET ORAL 2 TIMES DAILY
Qty: 60 TABLET | Refills: 0 | Status: SHIPPED | OUTPATIENT
Start: 2018-05-23 | End: 2018-06-22

## 2018-06-20 ENCOUNTER — DOCUMENTATION ONLY (OUTPATIENT)
Dept: BEHAVIORAL HEALTH | Facility: CLINIC | Age: 33
End: 2018-06-20

## 2018-06-20 NOTE — PROGRESS NOTES
Changing flowsheet to reflect pt has been unable to be reached and therefore not able to be waitlisted for [facility] any longer.

## 2018-06-22 DIAGNOSIS — F90.1 ATTENTION-DEFICIT HYPERACTIVITY DISORDER, PREDOMINANTLY HYPERACTIVE TYPE: ICD-10-CM

## 2018-06-22 DIAGNOSIS — Z79.899 CONTROLLED SUBSTANCE AGREEMENT SIGNED: Primary | ICD-10-CM

## 2018-06-22 NOTE — TELEPHONE ENCOUNTER
Reason for Call:  Medication or medication refill:    Do you use a [facility]?  Name of the pharmacy and phone number for the current request:  [facility] [address] ([place]) - [phone]    Name of the medication requested: adderrall     Other request: pt has 3 days left of med    Can we leave a detailed message on this number? YES    Phone number patient can be reached at: [phone]    Best Time:     Call taken on 6/22/2018 at 3:40 PM by Cynthia Irby

## 2018-06-22 NOTE — TELEPHONE ENCOUNTER
Adderall 20 mg      Last Written Prescription Date:  5/23/18  Last Fill Quantity: 60,   # refills: 0  Last Office Visit: 5/8/18  Future Office visit:    Next 5 appointments (look out 90 days)     Jun 26, 2018  4:00 PM CDT   PHYSICAL with Fiona Villanueva CNM   [facility]    [address]  [facility] [id number] [phone]                 Signed CSA on file.     RX monitoring program ([place]) reviewed:  not reviewed/not due - last done on 5/22/18    [place] profile:  [email or web address]     Routing refill request to provider for review/approval because:  Drug not on the FMG, UMP or  Health refill protocol or controlled substance

## 2018-06-25 RX ORDER — DEXTROAMPHETAMINE SACCHARATE, AMPHETAMINE ASPARTATE, DEXTROAMPHETAMINE SULFATE AND AMPHETAMINE SULFATE 5; 5; 5; 5 MG/1; MG/1; MG/1; MG/1
20 TABLET ORAL 2 TIMES DAILY
Qty: 60 TABLET | Refills: 0 | Status: SHIPPED | OUTPATIENT
Start: 2018-06-25 | End: 2018-07-27

## 2018-06-26 ENCOUNTER — RESULT FOLLOW UP (OUTPATIENT)
Dept: OBGYN | Facility: CLINIC | Age: 33
End: 2018-06-26

## 2018-06-26 ENCOUNTER — OFFICE VISIT (OUTPATIENT)
Dept: OBGYN | Facility: CLINIC | Age: 33
End: 2018-06-26
Payer: COMMERCIAL

## 2018-06-26 VITALS
DIASTOLIC BLOOD PRESSURE: 74 MMHG | HEART RATE: 60 BPM | BODY MASS INDEX: 25.51 KG/M2 | WEIGHT: 148.6 LBS | SYSTOLIC BLOOD PRESSURE: 108 MMHG

## 2018-06-26 DIAGNOSIS — R87.810 CERVICAL HIGH RISK HPV (HUMAN PAPILLOMAVIRUS) TEST POSITIVE: ICD-10-CM

## 2018-06-26 DIAGNOSIS — Z11.51 SCREENING FOR HUMAN PAPILLOMAVIRUS: ICD-10-CM
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
## 2018-06-26 DIAGNOSIS — Z01.419 WOMEN'S ANNUAL ROUTINE GYNECOLOGICAL EXAMINATION: Primary | ICD-10-CM

## 2018-06-26 DIAGNOSIS — Z12.4 PAP SMEAR FOR CERVICAL CANCER SCREENING: ICD-10-CM

## 2018-06-26 DIAGNOSIS — Z30.42 ENCOUNTER FOR DEPO-PROVERA CONTRACEPTION: ICD-10-CM

## 2018-06-26 PROCEDURE — 96372 THER/PROPH/DIAG INJ SC/IM: CPT | Performed by: ADVANCED PRACTICE MIDWIFE

## 2018-06-26 PROCEDURE — G0145 SCR C/V CYTO,THINLAYER,RESCR: HCPCS | Performed by: ADVANCED PRACTICE MIDWIFE

## 2018-06-26 PROCEDURE — G0476 HPV COMBO ASSAY CA SCREEN: HCPCS | Performed by: ADVANCED PRACTICE MIDWIFE

## 2018-06-26 PROCEDURE — 99395 PREV VISIT EST AGE 18-39: CPT | Mod: 25 | Performed by: ADVANCED PRACTICE MIDWIFE

## 2018-06-26 RX ORDER — AMOXICILLIN 500 MG/1
CAPSULE ORAL
Refills: 0 | COMMUNITY
Start: 2018-06-20 | End: 2020-04-17

## 2018-06-26 RX ORDER — BENZOCAINE/MENTHOL 6 MG-10 MG
LOZENGE MUCOUS MEMBRANE
Qty: 30 G | Refills: 0 | Status: SHIPPED | OUTPATIENT
Start: 2018-06-26 | End: 2020-06-11

## 2018-06-26 NOTE — PROGRESS NOTES
HPI: Melissa is a 32 year old  female who presents for annual exam.     Besides routine health maintenance,  she would like to discuss some insect bites that she got last weekend.  She woke up  morning with multiple raised lesions on various parts of her body.    Menses are rare and spotting lasting 1 days before next depo is due  Menses flow: normal and spotty.    No LMP recorded. Patient has had an injection..   Using depo or other injectable for contraception.    She is not currently considering pregnancy.    REPRODUCTIVE/GYNECOLOGIC HISTORY:  Melissa is sexually active with male partner(s) and is currently in monogamous relationship x 1 year.   STI testing offered?  Declined  History of abnormal Pap smear:  No  SOCIAL HISTORY  Abuse: does not report having previously been physical or sexually abused.    Do you feel safe in your environment? YES    She  reports that she has been smoking.  She has a 1.50 pack-year smoking history. She has never used smokeless tobacco.  Tobacco Cessation Action Plan: Information offered: Patient not interested at this time    Last PHQ-9 score on record =   PHQ-9 SCORE 2018   Total Score -   Total Score 6     Last GAD7 score on record =   RASHARD-7 SCORE 2018   Total Score -   Total Score 7     Alcohol Score = 1/week    HEALTH MAINTENANCE:  Cholesterol: (No results found for: CHOL History of abnormal lipids: Yes  Mammo: NA . History of abnormal Mammo: Not applicable.  Regular Self Breast Exams: No  TSH: (No results found for: TSH )  Pap; (  Lab Results   Component Value Date    PAP NIL 2015    PAP NIL 2013    PAP NIL 2011    )  Immunizations up to date: yes  (Gardasil, Tdap, Flu)  Health maintenance updated:  yes    HEALTHY HABITS  Eating habits: follows a balanced nutrition diet  Calcium/Vitamin D intake: source:  dairy Adequate? 2-3 servings per day  Exercise: How often do you exercise? Daily;Strength Training  Have you had an eye exam in  the last two years? YES  Do you routinely see the dentist once or twice yearly? YES      HISTORY:  Obstetric History       T1      L1     SAB0   TAB0   Ectopic0   Multiple0   Live Births1       # Outcome Date GA Lbr [place]/2nd Weight Sex Delivery Anes PTL Lv   1 Term 11 39w0d  8 lb 6 oz (3.799 kg) M -SEC   CHAPARRO        Past Medical History:   Diagnosis Date     Hyperlipidemia LDL goal <160 2011     Seizure (H)      Past Surgical History:   Procedure Laterality Date     GYN SURGERY  3/5/11         Family History   Problem Relation Age of Onset     Family History Negative Mother      Substance Abuse Father       46yo      HEART DISEASE Maternal Grandmother      heart attack     Family History Negative Son      born  [place]     Family History Negative Sister      1/2 bro     Family History Negative Brother      1/2 bro     Diabetes No family hx of      Cerebrovascular Disease No family hx of      Cancer No family hx of      Neurologic Disorder No family hx of      Social History     Social History     Marital status: Single     Spouse name: Milton     Number of children: N/A     Years of education: N/A     Occupational History     [employer]endent [employer]     Social History Main Topics     Smoking status: Current Every Day Smoker     Packs/day: 0.50     Years: 3.00     Last attempt to quit: 2012     Smokeless tobacco: Never Used      Comment: 5 -10 cigarrets daily     Alcohol use 0.0 oz/week     0 Standard drinks or equivalent per week      Comment: 1-2 drinks/week     Drug use: No     Sexual activity: No     Other Topics Concern      Service No     Blood Transfusions No     Caffeine Concern Yes     occassional weekends, has cut down with pregnancy     Sleep Concern No     Stress Concern No     Weight Concern No     Special Diet No     Exercise Yes     walks every day     Seat Belt Yes     Self-Exams No     Social History  Narrative       Current Outpatient Prescriptions:      amphetamine-dextroamphetamine (ADDERALL) 20 MG per tablet, Take 1 tablet (20 mg) by mouth 2 times daily, Disp: 60 tablet, Rfl: 0     buPROPion (WELLBUTRIN XL) 150 MG 24 hr tablet, TAKE 1 TABLET (150 MG) BY MOUTH EVERY MORNING, Disp: 90 tablet, Rfl: 1     FLUoxetine (PROZAC) 10 MG capsule, TAKE 1 CAPSULE (10 MG) BY MOUTH DAILY, Disp: 90 capsule, Rfl: 1     ibuprofen (ADVIL,MOTRIN) 200 MG tablet, Take 3 tablets by mouth every 8 hours as needed for pain., Disp: 30 tablet, Rfl: 0     medroxyPROGESTERone (DEPO-PROVERA) 150 MG/ML injection, Inject 1 mL (150 mg) into the muscle every 3 months, Disp: 1 mL, Rfl: 3     zolpidem (AMBIEN CR) 6.25 MG CR tablet, Take 1 tablet (6.25 mg) by mouth nightly as needed for sleep, Disp: 60 tablet, Rfl: 0     amoxicillin (AMOXIL) 500 MG capsule, TAKE 1 CAPSULES EVERY 8 HOURS (3 TIMES PER DAY) UNTIL GONE, Disp: , Rfl: 0     omeprazole (PRILOSEC) 20 MG CR capsule, Take 1 capsule (20 mg) by mouth daily (Patient not taking: Reported on 6/26/2018), Disp: 30 capsule, Rfl: 3     [DISCONTINUED] medroxyPROGESTERone (DEPO-PROVERA) 150 MG/ML injection, Inject 1 mL (150 mg) into the muscle every 3 months, Disp: 3 mL, Rfl: 3     Allergies   Allergen Reactions     Clindamycin/Lincomycin      Vicodin [Hydrocodone-Acetaminophen]        Past medical, surgical, social and family history were reviewed and updated in [facility].    ROS:   CONSTITUTIONAL: NEGATIVE for fever, chills, change in weight  INTEGUMENTARU/SKIN: NEGATIVE for worrisome rashes, moles or lesions  POSITIVE for pruritic bug bites.  EYES: NEGATIVE for vision changes or irritation  ENT: NEGATIVE for ear, mouth and throat problems  RESP: NEGATIVE for significant cough or SOB  BREAST: NEGATIVE for masses, tenderness or discharge  CV: NEGATIVE for chest pain, palpitations or peripheral edema  GI: NEGATIVE for nausea, abdominal pain, heartburn, or change in bowel habits  : NEGATIVE for unusual  urinary or vaginal symptoms. Periods are regular.  MUSCULOSKELETAL: NEGATIVE for significant arthralgias or myalgia  NEURO: NEGATIVE for weakness, dizziness or paresthesias  ENDOCRINE: NEGATIVE for temperature intolerance, skin/hair changes  HEME/ALLERGY/IMMUNE: NEGATIVE for bleeding problems  PSYCHIATRIC: NEGATIVE for changes in mood or affect    PHYSICAL EXAM:  /74 (BP Location: Left arm, Patient Position: Chair, Cuff Size: Adult Regular)  Pulse 60  Wt 148 lb 9.6 oz (67.4 kg)  BMI 25.51 kg/m2   BMI: Body mass index is 25.51 kg/(m^2).  Constitutional: healthy, alert and no distress  Neck: symmetrical, thyroid normal size, no masses present, no lymphadenopathy present.   Skin: multiple raised erythremic lesions on patient legs and arms and neck. Larges lesion is ~ 2 cm   Cardiovascular: RRR, no murmurs present  Respiratory: breathing unlabored, lungs CTA bilaterally  Breast:normal without masses, tenderness or nipple discharge and no palpable axillary masses or adenopathy  Gastrointestinal: abdomen soft, non-tender, bowel sounds present  PELVIC EXAM:  Vulva: No lesions, no adenopathy, BUS WNL  Vagina: Moist, pink, discharge normal  well rugated, no lesions  Cervix:smooth, pink, no visible lesions, Pap obtained  Uterus: Normal size, non-tender, mobile  Ovaries: No masses palpated  Rectal exam: deferred    ASSESSMENT/PLAN:       Diagnosis Comments   1. Women's annual routine gynecological examination  Exam normal and appropriate for age   2. Pap smear for cervical cancer screening  PAP imaged thin layer screen Will advise patient of pap result when available. If normal, next pap due in 5 years.     3. Screening for human papillomavirus  HPV High Risk Types DNA Cervical Will advise patient of HPV result when available. If normal, next HPV due in 5 years.     4. Bug bite, initial encounter  hydrocortisone (CORTAID) 1 % cream    5. Encounter for Depo-Provera contraception  INJECTION INTRAMUSCULAR OR SUB-Q             COUNSELING:   Reviewed preventive health counseling, as reflected in patient instructions   reports that she has been smoking.  She has a 1.50 pack-year smoking history. She has never used smokeless tobacco.  Tobacco Cessation Action Plan: Information offered: Patient not interested at this time    SHADI Han, MAGGIM

## 2018-06-26 NOTE — MR AVS SNAPSHOT
After Visit Summary   6/26/2018    Melissa Benavidez    MRN: [id number]           Patient Information     Date Of Birth          1985        Visit Information        Provider Department      6/26/2018 4:00 PM Fiona Villanueva KATHRINE [facility] [address]        Today's Diagnoses     Women's annual routine gynecological examination    -  1    Pap smear for cervical cancer screening        Screening for human papillomavirus        Bug bite, initial encounter          Care Instructions    Preventive Health Recommendations  Female Ages 21 - 39  Yearly exam:   See your health care provider every year in order to  1. Review health changes.  2. Discuss preventive care.    3. Review your medicines if you your provider has prescribed any.      You do not need a Pap test if your uterus was removed (hysterectomy) and you have not had cancer.    You should be tested each year for STIs (sexually transmitted diseases) if you're at risk.     Talk to your provider about how often to have your cholesterol checked, about every 5 years if normal.    If you are at risk for diabetes, you should have a diabetes test (fasting glucose).    Vitamin D deficiency screening and thyroid disease screening is also recommended every 3-5 years depending on risk factors or more often if symptomatic  PAP Smear:   Until age 30: Get a Pap test every three years (more often if you have had an abnormal result)    After age 30: Talk to your provider about whether you should have a Pap test every 3 years or have a Pap test with HPV screening every 5 years.   Shots: Get a flu shot each year. Get a tetanus shot every 10 years.   Nutrition:     Eat at least 5 servings of fruits and vegetables each day    Eat REAL food, stay away from processed foods.    Eat whole-grain bread, whole-wheat pasta and brown rice instead of white grains and rice.    For bone health:  Eat calcium-rich foods or take calcium pills (500 to 600 mg) twice a day  "with food (1200 mg per day). Also take vitamin D (2000 IUs) each day.     Lifestyle    Exercise regularly (30 minutes a day, 5 days of the week). This will help you control your weight and prevent disease.    Weight bearing exercise such as weight lifting, walking, running and yoga will be beneficial later in life preventing osteoporosis     Limit alcohol to one drink per day.    No smoking.     Wear sunscreen to prevent skin cancer.    See your dentist every six months to one year for an exam and cleaning depending on their recommendation    Get an eye exam every two years or more often if you wear glasses or contacts.                Follow-ups after your visit        Follow-up notes from your care team     Return in about 1 year (around 6/26/2019) for Physical Exam.      Who to contact     If you have questions or need follow up information about today's clinic visit or your schedule please contact [facility] directly at [phone].  Normal or non-critical lab and imaging results will be communicated to you by Deltekhart, letter or phone within 4 business days after the clinic has received the results. If you do not hear from us within 7 days, please contact the clinic through Deltekhart or phone. If you have a critical or abnormal lab result, we will notify you by phone as soon as possible.  Submit refill requests through Embibe or call your pharmacy and they will forward the refill request to us. Please allow 3 business days for your refill to be completed.          Additional Information About Your Visit        DeltekharZuujit Information     Embibe lets you send messages to your doctor, view your test results, renew your prescriptions, schedule appointments and more. To sign up, go to [email or web address] . Click on \"Log in\" on the left side of the screen, which will take you to the Welcome page. Then click on \"Sign up Now\" on the right side of the page.     You will be asked to enter the access " code listed below, as well as some personal information. Please follow the directions to create your username and password.     Your access code is: [email or web address]  Expires: 2018  4:33 PM     Your access code will  in 90 days. If you need help or a new code, please call your [facility] clinic or [phone].        Care EveryWhere ID     This is your Care EveryWhere ID. This could be used by other organizations to access your [facility] medical records  [id number]        Your Vitals Were     Pulse BMI (Body Mass Index)                60 25.51 kg/m2           Blood Pressure from Last 3 Encounters:   18 108/74   18 120/70   18 104/60    Weight from Last 3 Encounters:   18 148 lb 9.6 oz (67.4 kg)   18 142 lb (64.4 kg)   18 152 lb 11.2 oz (69.3 kg)              We Performed the Following     HPV High Risk Types DNA Cervical     PAP imaged thin layer screen          Today's Medication Changes          These changes are accurate as of 18  5:02 PM.  If you have any questions, ask your nurse or doctor.               Start taking these medicines.        Dose/Directions    hydrocortisone 1 % cream   Commonly known as:  CORTAID   Used for:  Bug bite, initial encounter   Started by:  Fiona Villanueva CNM        Apply sparingly to affected area three times daily for 14 days.   Quantity:  30 g   Refills:  0            Where to get your medicines      These medications were sent to [facility]/pharmacy #[id number] - [facility] [address]  [address] [facility] [address]     Phone:  [phone]     hydrocortisone 1 % cream                Primary Care Provider Office Phone # Fax #    Franca Mandujano [phone]       [address] [facility] [address]        Equal Access to Services     SHILPA JEFFERSON : Casie Braswell, wagenada luqadaha, qaybta kaalmada matthew, brad goldberg. So [facility]  [phone].    ATENCIÓN: Si man bergman, tiene a matthews disposición servicios gratuitos de asistencia lingüística. Kaisa ruiz [phone].    We comply with applicable federal civil rights laws and [place] laws. We do not discriminate on the basis of race, color, national origin, age, disability, sex, sexual orientation, or gender identity.            Thank you!     Thank you for choosing [facility]  for your care. Our goal is always to provide you with excellent care. Hearing back from our patients is one way we can continue to improve our services. Please take a few minutes to complete the written survey that you may receive in the mail after your visit with us. Thank you!             Your Updated Medication List - Protect others around you: Learn how to safely use, store and throw away your medicines at www.disposemymeds.org.          This list is accurate as of 6/26/18  5:02 PM.  Always use your most recent med list.                   Brand Name Dispense Instructions for use Diagnosis    amoxicillin 500 MG capsule    AMOXIL     TAKE 1 CAPSULES EVERY 8 HOURS (3 TIMES PER DAY) UNTIL GONE        amphetamine-dextroamphetamine 20 MG per tablet    ADDERALL    60 tablet    Take 1 tablet (20 mg) by mouth 2 times daily    Attention-deficit hyperactivity disorder, predominantly hyperactive type       buPROPion 150 MG 24 hr tablet    WELLBUTRIN XL    90 tablet    TAKE 1 TABLET (150 MG) BY MOUTH EVERY MORNING    Anxiety       FLUoxetine 10 MG capsule    PROzac    90 capsule    TAKE 1 CAPSULE (10 MG) BY MOUTH DAILY    Anxiety       hydrocortisone 1 % cream    CORTAID    30 g    Apply sparingly to affected area three times daily for 14 days.    Bug bite, initial encounter       ibuprofen 200 MG tablet    ADVIL/MOTRIN    30 tablet    Take 3 tablets by mouth every 8 hours as needed for pain.        medroxyPROGESTERone 150 MG/ML injection    DEPO-PROVERA    1 mL    Inject 1 mL (150 mg) into the muscle every 3  months    Unspecified contraceptive management       omeprazole 20 MG CR capsule    priLOSEC    30 capsule    Take 1 capsule (20 mg) by mouth daily    Gastroesophageal reflux disease without esophagitis       zolpidem 6.25 MG CR tablet    AMBIEN CR    60 tablet    Take 1 tablet (6.25 mg) by mouth nightly as needed for sleep    Primary insomnia

## 2018-06-26 NOTE — LETTER
July 6, 2018      Melissahorace Benavidez  [address] [facility] [address]    Dear ,      This letter is in regards to the PAP smear and HPV (Human Papillomavirus) test you had done recently. Your PAP test result is normal, but your HPV (Human Papillomavirus) test was positive.     About 80 percent of women have been exposed to HPV virus throughout their lifetime. There is no medication for the treatment of HPV. Typically your own immune system gets rid of the virus before it does harm. HPV is spread by direct skin-to-skin contact, including sexual intercourse, oral sex, anal sex, or any other contact involving the genital area (example: hand to genital contact). It is not possible to become infected with HPV by touching an object, such as a toilet seat. Most people who are infected with HPV have no signs or symptoms.    Things that you can do to boost your immune system and help your body get rid of HPV: get plenty of rest, eat a well-balanced diet of healthy foods, and stop smoking.     Please return in 1 year to repeat your pap smear and HPV test.     If you have additional questions regarding this result, please call [phone].    Sincerely,      SERAFIN CASTRO CNM/anuradha

## 2018-06-26 NOTE — NURSING NOTE
"  Chief Complaint   Patient presents with     Physical     last pap 08/17/15- NIL     Imm/Inj     Depo, patient is overdue and needs UPT       Initial /74 (BP Location: Left arm, Patient Position: Chair, Cuff Size: Adult Regular)  Pulse 60  Wt 148 lb 9.6 oz (67.4 kg)  BMI 25.51 kg/m2 Estimated body mass index is 25.51 kg/(m^2) as calculated from the following:    Height as of 5/8/18: 5' 4\" (1.626 m).    Weight as of this encounter: 148 lb 9.6 oz (67.4 kg).  Medication Reconciliation: complete    Sonu Carty CMA      "

## 2018-06-26 NOTE — LETTER
June 12, 2019      Melissa FRANNY ChavezBenavidez  [address] [facility] [address]    Dear MsErlin,      At [facility], your health and wellness is our primary concern. That is why we are following up on a positive high risk HPV test from 06/26/18. Your provider had recommended that you have a Pap smear and HPV test completed by 06/26/19. Our records do not show that this has been scheduled.    It is important to complete the follow up that your provider has suggested for you to ensure that there are no worsening changes which may, over time, develop into cancer.      Please contact our office at  [phone] to schedule an appointment for a Pap smear and HPV test at your earliest convenience. If you have questions or concerns, please call the clinic and we will be happy to assist you.    If you have completed the tests outside of [facility], please have the results forwarded to our office. We will update the chart for your primary Physician to review before your next annual physical.     Thank you for choosing [facility]!    Sincerely,      Your [facility] Care Team/[facility]

## 2018-06-29 LAB
COPATH REPORT: NORMAL
PAP: NORMAL

## 2018-07-03 LAB
FINAL DIAGNOSIS: ABNORMAL
HPV HR 12 DNA CVX QL NAA+PROBE: POSITIVE
HPV16 DNA SPEC QL NAA+PROBE: NEGATIVE
HPV18 DNA SPEC QL NAA+PROBE: NEGATIVE
SPECIMEN DESCRIPTION: ABNORMAL
SPECIMEN SOURCE CVX/VAG CYTO: ABNORMAL

## 2018-07-05 PROBLEM — R87.810 CERVICAL HIGH RISK HPV (HUMAN PAPILLOMAVIRUS) TEST POSITIVE: Status: ACTIVE | Noted: 2018-06-26

## 2018-07-05 NOTE — PROGRESS NOTES
6/26/18 NIL pap, + HR HPV (not 16/18). Plan 1 year cotest  07/06/18 Result letter sent at request of RN. ([facility])  06/12/19 Cotest reminder letter sent. ([facility])  07/03/19 [facility] clinic and schedule. ([facility])  07/31/19 Patient is lost to pap tracking follow-up. FYI routed to provider. ([facility])

## 2018-07-27 DIAGNOSIS — F51.01 PRIMARY INSOMNIA: ICD-10-CM

## 2018-07-27 DIAGNOSIS — Z79.899 CONTROLLED SUBSTANCE AGREEMENT SIGNED: Primary | ICD-10-CM

## 2018-07-27 DIAGNOSIS — F90.1 ATTENTION-DEFICIT HYPERACTIVITY DISORDER, PREDOMINANTLY HYPERACTIVE TYPE: ICD-10-CM

## 2018-07-27 RX ORDER — DEXTROAMPHETAMINE SACCHARATE, AMPHETAMINE ASPARTATE, DEXTROAMPHETAMINE SULFATE AND AMPHETAMINE SULFATE 5; 5; 5; 5 MG/1; MG/1; MG/1; MG/1
20 TABLET ORAL 2 TIMES DAILY
Qty: 60 TABLET | Refills: 0 | Status: SHIPPED | OUTPATIENT
Start: 2018-07-27 | End: 2018-08-29

## 2018-07-27 RX ORDER — ZOLPIDEM TARTRATE 6.25 MG/1
6.25 TABLET, FILM COATED, EXTENDED RELEASE ORAL
Qty: 60 TABLET | Refills: 0 | Status: SHIPPED | OUTPATIENT
Start: 2018-07-27 | End: 2018-10-24

## 2018-07-27 NOTE — TELEPHONE ENCOUNTER
Reason for Call:  Medication or medication refill:    Do you use a [facility]?  Name of the pharmacy and phone number for the current request:  [facility] [address] ([place]) - [phone]    Name of the medication requested: adderall & ambien    Other request: none    Can we leave a detailed message on this number? YES    Phone number patient can be reached at: Home number on file [phone] (home)    Best Time: any    Call taken on 7/27/2018 at 10:49 AM by Milly Loyola

## 2018-08-29 DIAGNOSIS — F90.1 ATTENTION-DEFICIT HYPERACTIVITY DISORDER, PREDOMINANTLY HYPERACTIVE TYPE: ICD-10-CM

## 2018-08-29 DIAGNOSIS — Z79.899 CONTROLLED SUBSTANCE AGREEMENT SIGNED: Primary | ICD-10-CM

## 2018-08-29 RX ORDER — DEXTROAMPHETAMINE SACCHARATE, AMPHETAMINE ASPARTATE, DEXTROAMPHETAMINE SULFATE AND AMPHETAMINE SULFATE 5; 5; 5; 5 MG/1; MG/1; MG/1; MG/1
20 TABLET ORAL 2 TIMES DAILY
Qty: 60 TABLET | Refills: 0 | Status: SHIPPED | OUTPATIENT
Start: 2018-08-29 | End: 2018-09-26

## 2018-08-29 NOTE — TELEPHONE ENCOUNTER
Fill at [facility].     Requested Prescriptions   Pending Prescriptions Disp Refills     amphetamine-dextroamphetamine (ADDERALL) 20 MG per tablet  Last Written Prescription Date:  7/27/18  Last Fill Quantity: 60,  # refills: 0   Last office visit: 5/8/2018 with prescribing provider:  Delbert   Future Office Visit:    60 tablet 0     Sig: Take 1 tablet (20 mg) by mouth 2 times daily    There is no refill protocol information for this order      RX [place] reviewed:  reviewed- no concerns    [place]:  [email or web address]    Routing refill request to provider for review/approval because:  Drug not on the [facility] refill protocol

## 2018-08-29 NOTE — TELEPHONE ENCOUNTER
Reason for Call:  Medication or medication refill:    Do you use a [facility]?  Name of the pharmacy and phone number for the current request[facility]  [facility] [address] ([place]) - [phone]    Name of the medication requested: adderall    Other request: none    Can we leave a detailed message on this number? YES    Phone number patient can be reached at: Home number on file [phone] (home)    Best Time: any    Call taken on 8/29/2018 at 10:51 AM by Milly Loyola

## 2018-09-17 ENCOUNTER — ALLIED HEALTH/NURSE VISIT (OUTPATIENT)
Dept: NURSING | Facility: CLINIC | Age: 33
End: 2018-09-17
Payer: COMMERCIAL

## 2018-09-17 VITALS — BODY MASS INDEX: 24.55 KG/M2 | DIASTOLIC BLOOD PRESSURE: 76 MMHG | SYSTOLIC BLOOD PRESSURE: 114 MMHG | WEIGHT: 143 LBS

## 2018-09-17 DIAGNOSIS — Z30.013 EVALUATION FOR CONTRACEPTIVE INJECTION: Primary | ICD-10-CM

## 2018-09-17 PROCEDURE — 96372 THER/PROPH/DIAG INJ SC/IM: CPT

## 2018-09-17 PROCEDURE — 99207 ZZC NO CHARGE NURSE ONLY: CPT

## 2018-09-17 NOTE — NURSING NOTE
"Chief Complaint   Patient presents with     Allied Health Visit     Depo injection.        Initial /76  Wt 143 lb (64.9 kg)  Breastfeeding? No  BMI 24.55 kg/m2 Estimated body mass index is 24.55 kg/(m^2) as calculated from the following:    Height as of 18: 5' 4\" (1.626 m).    Weight as of this encounter: 143 lb (64.9 kg).  BP completed using cuff size: regular        BP: 114/76    LAST PAP/EXAM:   Lab Results   Component Value Date    PAP NIL 2018     URINE HCG:not indicated    The following medication was given:     MEDICATION: Depo Provera 150mg  ROUTE: IM  SITE: LUQ - Gluteus  : [employer]ph[employer]  LOT #: [id number]  EXP:2020  NEXT INJECTION DUE: 12/3/18 - 18   Provider: Fiona Thomas LPN                 "

## 2018-09-17 NOTE — MR AVS SNAPSHOT
After Visit Summary   9/17/2018    Melissa Benavidez    MRN: [id number]           Patient Information     Date Of Birth          1985        Visit Information        Provider Department      9/17/2018 3:30 PM RI OB NURSE [facility]        Today's Diagnoses     Evaluation for contraceptive injection    -  1       Follow-ups after your visit        Who to contact     If you have questions or need follow up information about today's clinic visit or your schedule please contact [facility] directly at [phone].  Normal or non-critical lab and imaging results will be communicated to you by MyChart, letter or phone within 4 business days after the clinic has received the results. If you do not hear from us within 7 days, please contact the clinic through MyChart or phone. If you have a critical or abnormal lab result, we will notify you by phone as soon as possible.  Submit refill requests through [employer] or call your pharmacy and they will forward the refill request to us. Please allow 3 business days for your refill to be completed.          Additional Information About Your Visit        Care EveryWhere ID     This is your Care EveryWhere ID. This could be used by other organizations to access your [place] medical records  [id number]        Your Vitals Were     Breastfeeding? BMI (Body Mass Index)                No 24.55 kg/m2           Blood Pressure from Last 3 Encounters:   09/17/18 114/76   06/26/18 108/74   05/08/18 120/70    Weight from Last 3 Encounters:   09/17/18 143 lb (64.9 kg)   06/26/18 148 lb 9.6 oz (67.4 kg)   05/08/18 142 lb (64.4 kg)              We Performed the Following     INJECTION INTRAMUSCULAR OR SUB-Q     Medroxyprogesterone inj/1mg (Depo Provera J-Code)        Primary Care Provider Office Phone # Fax #    Franca Mandujano [phone]       [address]  [facility] [address]        Equal Access to Services      [facility] : Hadii aad ku payam Braswell, waaxda luqadaha, qaybta kaalmada jankitimsal, waxjosé miguel almaz thapazoëelliott urena . So [facility] [phone].    ATENCIÓN: Si habla español, tiene a matthews disposición servicios gratuitos de asistencia lingüística. Llame al [phone].    We comply with applicable federal civil rights laws and [place] laws. We do not discriminate on the basis of race, color, national origin, age, disability, sex, sexual orientation, or gender identity.            Thank you!     Thank you for choosing [facility]  for your care. Our goal is always to provide you with excellent care. Hearing back from our patients is one way we can continue to improve our services. Please take a few minutes to complete the written survey that you may receive in the mail after your visit with us. Thank you!             Your Updated Medication List - Protect others around you: Learn how to safely use, store and throw away your medicines at www.disposemymeds.org.          This list is accurate as of 9/17/18  3:51 PM.  Always use your most recent med list.                   Brand Name Dispense Instructions for use Diagnosis    amoxicillin 500 MG capsule    AMOXIL     TAKE 1 CAPSULES EVERY 8 HOURS (3 TIMES PER DAY) UNTIL GONE        amphetamine-dextroamphetamine 20 MG per tablet    ADDERALL    60 tablet    Take 1 tablet (20 mg) by mouth 2 times daily    Attention-deficit hyperactivity disorder, predominantly hyperactive type, Controlled substance agreement signed       buPROPion 150 MG 24 hr tablet    WELLBUTRIN XL    90 tablet    TAKE 1 TABLET (150 MG) BY MOUTH EVERY MORNING    Anxiety       FLUoxetine 10 MG capsule    PROzac    90 capsule    TAKE 1 CAPSULE (10 MG) BY MOUTH DAILY    Anxiety       hydrocortisone 1 % cream    CORTAID    30 g    Apply sparingly to affected area three times daily for 14 days.    Bug bite, initial encounter       ibuprofen 200 MG tablet    ADVIL/MOTRIN    30  tablet    Take 3 tablets by mouth every 8 hours as needed for pain.        medroxyPROGESTERone 150 MG/ML injection    DEPO-PROVERA    1 mL    Inject 1 mL (150 mg) into the muscle every 3 months    Unspecified contraceptive management       omeprazole 20 MG CR capsule    priLOSEC    30 capsule    Take 1 capsule (20 mg) by mouth daily    Gastroesophageal reflux disease without esophagitis       zolpidem 6.25 MG CR tablet    AMBIEN CR    60 tablet    Take 1 tablet (6.25 mg) by mouth nightly as needed for sleep    Primary insomnia

## 2018-09-26 ENCOUNTER — TELEPHONE (OUTPATIENT)
Dept: INTERNAL MEDICINE | Facility: CLINIC | Age: 33
End: 2018-09-26

## 2018-09-26 DIAGNOSIS — Z79.899 CONTROLLED SUBSTANCE AGREEMENT SIGNED: ICD-10-CM

## 2018-09-26 DIAGNOSIS — F90.1 ATTENTION-DEFICIT HYPERACTIVITY DISORDER, PREDOMINANTLY HYPERACTIVE TYPE: ICD-10-CM

## 2018-09-26 RX ORDER — DEXTROAMPHETAMINE SACCHARATE, AMPHETAMINE ASPARTATE, DEXTROAMPHETAMINE SULFATE AND AMPHETAMINE SULFATE 5; 5; 5; 5 MG/1; MG/1; MG/1; MG/1
20 TABLET ORAL 2 TIMES DAILY
Qty: 60 TABLET | Refills: 0 | Status: SHIPPED | OUTPATIENT
Start: 2018-09-26 | End: 2018-10-24

## 2018-09-26 NOTE — TELEPHONE ENCOUNTER
Pt calls needing a refill on Adderall 20 mil. Please bring to pharmacy, [facility]. Ok to leave on [phone]

## 2018-09-26 NOTE — TELEPHONE ENCOUNTER
RX monitoring program ([place]) reviewed:  reviewed- no concerns    [place] profile:  [email or web address]

## 2018-10-23 DIAGNOSIS — F90.1 ATTENTION-DEFICIT HYPERACTIVITY DISORDER, PREDOMINANTLY HYPERACTIVE TYPE: ICD-10-CM

## 2018-10-23 DIAGNOSIS — F51.01 PRIMARY INSOMNIA: ICD-10-CM

## 2018-10-23 DIAGNOSIS — Z79.899 CONTROLLED SUBSTANCE AGREEMENT SIGNED: ICD-10-CM

## 2018-10-23 NOTE — TELEPHONE ENCOUNTER
Reason for Call:  Medication or medication refill:    Do you use a [facility]? Yes     Name of the pharmacy and phone number for the current request:  [place] [facility]    Name of the medication requested: adderall and ambien    Other request: none    Can we leave a detailed message on this number? YES    Phone number patient can be reached at: Cell number on file:    Telephone Information:   Mobile [phone]       Best Time: asap    Call taken on 10/23/2018 at 3:43 PM by Danica Desai

## 2018-10-24 RX ORDER — DEXTROAMPHETAMINE SACCHARATE, AMPHETAMINE ASPARTATE, DEXTROAMPHETAMINE SULFATE AND AMPHETAMINE SULFATE 5; 5; 5; 5 MG/1; MG/1; MG/1; MG/1
20 TABLET ORAL 2 TIMES DAILY
Qty: 60 TABLET | Refills: 0 | Status: SHIPPED | OUTPATIENT
Start: 2018-10-24 | End: 2018-11-28

## 2018-10-24 RX ORDER — ZOLPIDEM TARTRATE 6.25 MG/1
6.25 TABLET, FILM COATED, EXTENDED RELEASE ORAL
Qty: 60 TABLET | Refills: 0 | Status: SHIPPED | OUTPATIENT
Start: 2018-10-24 | End: 2018-12-21

## 2018-10-24 NOTE — TELEPHONE ENCOUNTER
Fill at [facility].     Requested Prescriptions   Pending Prescriptions Disp Refills     amphetamine-dextroamphetamine (ADDERALL) 20 MG per tablet  Last Written Prescription Date:  9/26/18  Last Fill Quantity: 60,  # refills: 0   Last office visit: 5/8/2018 with prescribing provider:  Delbert Carter Office Visit:    60 tablet 0     Sig: Take 1 tablet (20 mg) by mouth 2 times daily    There is no refill protocol information for this order        zolpidem (AMBIEN CR) 6.25 MG CR tablet  Last Written Prescription Date:  7/27/18  Last Fill Quantity: 60,  # refills: 0   Last office visit: 5/8/2018 with prescribing provider:  Delbert Carter Office Visit:    60 tablet 0     Sig: Take 1 tablet (6.25 mg) by mouth nightly as needed for sleep    There is no refill protocol information for this order      Signed CSA on file.    RX monitoring program ([place]MP) reviewed:  not reviewed/not due - last done on 9/26/18    [place]MP profile:  [email or web address]     Routing refill request to provider for review/approval because:  Drug not on the [facility] refill protocol

## 2018-11-07 ENCOUNTER — OFFICE VISIT (OUTPATIENT)
Dept: OBGYN | Facility: CLINIC | Age: 33
End: 2018-11-07
Payer: COMMERCIAL

## 2018-11-07 VITALS
WEIGHT: 143 LBS | DIASTOLIC BLOOD PRESSURE: 70 MMHG | BODY MASS INDEX: 24.41 KG/M2 | HEIGHT: 64 IN | HEART RATE: 88 BPM | SYSTOLIC BLOOD PRESSURE: 116 MMHG

## 2018-11-07 DIAGNOSIS — N76.0 BACTERIAL VAGINOSIS: ICD-10-CM

## 2018-11-07 DIAGNOSIS — Z30.42 ENCOUNTER FOR MANAGEMENT AND INJECTION OF DEPO-PROVERA: ICD-10-CM

## 2018-11-07 DIAGNOSIS — B96.89 BACTERIAL VAGINOSIS: ICD-10-CM

## 2018-11-07 DIAGNOSIS — N93.9 VAGINAL BLEEDING: Primary | ICD-10-CM

## 2018-11-07 LAB
SPECIMEN SOURCE: ABNORMAL
WET PREP SPEC: ABNORMAL

## 2018-11-07 PROCEDURE — 99213 OFFICE O/P EST LOW 20 MIN: CPT | Performed by: ADVANCED PRACTICE MIDWIFE

## 2018-11-07 PROCEDURE — 87591 N.GONORRHOEAE DNA AMP PROB: CPT | Performed by: ADVANCED PRACTICE MIDWIFE

## 2018-11-07 PROCEDURE — 87491 CHLMYD TRACH DNA AMP PROBE: CPT | Performed by: ADVANCED PRACTICE MIDWIFE

## 2018-11-07 PROCEDURE — 87210 SMEAR WET MOUNT SALINE/INK: CPT | Performed by: ADVANCED PRACTICE MIDWIFE

## 2018-11-07 NOTE — MR AVS SNAPSHOT
"              After Visit Summary   11/7/2018    Melissa Benavidez    MRN: [id number]           Patient Information     Date Of Birth          1985        Visit Information        Provider Department      11/7/2018 4:00 PM aCrmen Villanueva CNM [facility]        Today's Diagnoses     Vaginal bleeding    -  1    Bacterial vaginosis        Encounter for management and injection of depo-Provera           Follow-ups after your visit        Future tests that were ordered for you today     Open Future Orders        Priority Expected Expires Ordered    DX Hip/Pelvis/Spine Routine  11/8/2019 11/8/2018            Who to contact     If you have questions or need follow up information about today's clinic visit or your schedule please contact [facility] directly at [phone].  Normal or non-critical lab and imaging results will be communicated to you by MyChart, letter or phone within 4 business days after the clinic has received the results. If you do not hear from us within 7 days, please contact the clinic through MyChart or phone. If you have a critical or abnormal lab result, we will notify you by phone as soon as possible.  Submit refill requests through [employer] or call your pharmacy and they will forward the refill request to us. Please allow 3 business days for your refill to be completed.          Additional Information About Your Visit        Care EveryWhere ID     This is your Care EveryWhere ID. This could be used by other organizations to access your [place] medical records  [id number]        Your Vitals Were     Pulse Height Breastfeeding? BMI (Body Mass Index)          88 5' 4\" (1.626 m) No 24.55 kg/m2         Blood Pressure from Last 3 Encounters:   11/07/18 116/70   09/17/18 114/76   06/26/18 108/74    Weight from Last 3 Encounters:   11/07/18 143 lb (64.9 kg)   09/17/18 143 lb (64.9 kg)   06/26/18 148 lb 9.6 oz (67.4 kg)              We Performed the " Following     CHLAMYDIA TRACHOMATIS PCR     NEISSERIA GONORRHOEA PCR     Wet prep          Today's Medication Changes          These changes are accurate as of 11/7/18 11:59 PM.  If you have any questions, ask your nurse or doctor.               Start taking these medicines.        Dose/Directions    metroNIDAZOLE 0.75 % vaginal gel   Commonly known as:  METROGEL   Used for:  Bacterial vaginosis   Started by:  Carmen Villanueva CNM        Dose:  1 applicator   Place 1 applicator (5 g) vaginally daily for 5 days   Quantity:  25 g   Refills:  0            Where to get your medicines      Some of these will need a paper prescription and others can be bought over the counter.  Ask your nurse if you have questions.     You don't need a prescription for these medications     metroNIDAZOLE 0.75 % vaginal gel                Primary Care Provider Office Phone # Fax #    Franca Francisco Mandujano [phone]       [address]  [facility] [address]        Equal Access to Services     [facility]: Hadii aad ku hadasho Soomaali, waaxda luqadaha, qaybta kaalmada adeegyada, waxay asifin haytierneyn christo urena . So [facility] [phone].    ATENCIÓN: Si habla español, tiene a matthews disposición servicios gratuitos de asistencia lingüística. Kasia al [phone].    We comply with applicable federal civil rights laws and [place] laws. We do not discriminate on the basis of race, color, national origin, age, disability, sex, sexual orientation, or gender identity.            Thank you!     Thank you for choosing [facility]  for your care. Our goal is always to provide you with excellent care. Hearing back from our patients is one way we can continue to improve our services. Please take a few minutes to complete the written survey that you may receive in the mail after your visit with us. Thank you!             Your Updated Medication List - Protect others around you: Learn how to safely use,  store and throw away your medicines at www.disposemymeds.org.          This list is accurate as of 11/7/18 11:59 PM.  Always use your most recent med list.                   Brand Name Dispense Instructions for use Diagnosis    amoxicillin 500 MG capsule    AMOXIL     TAKE 1 CAPSULES EVERY 8 HOURS (3 TIMES PER DAY) UNTIL GONE        amphetamine-dextroamphetamine 20 MG per tablet    ADDERALL    60 tablet    Take 1 tablet (20 mg) by mouth 2 times daily    Attention-deficit hyperactivity disorder, predominantly hyperactive type, Controlled substance agreement signed       buPROPion 150 MG 24 hr tablet    WELLBUTRIN XL    90 tablet    TAKE 1 TABLET (150 MG) BY MOUTH EVERY MORNING    Anxiety       FLUoxetine 10 MG capsule    PROzac    90 capsule    TAKE 1 CAPSULE (10 MG) BY MOUTH DAILY    Anxiety       hydrocortisone 1 % cream    CORTAID    30 g    Apply sparingly to affected area three times daily for 14 days.    Bug bite, initial encounter       ibuprofen 200 MG tablet    ADVIL/MOTRIN    30 tablet    Take 3 tablets by mouth every 8 hours as needed for pain.        medroxyPROGESTERone 150 MG/ML injection    DEPO-PROVERA    1 mL    Inject 1 mL (150 mg) into the muscle every 3 months    Unspecified contraceptive management       metroNIDAZOLE 0.75 % vaginal gel    METROGEL    25 g    Place 1 applicator (5 g) vaginally daily for 5 days    Bacterial vaginosis       omeprazole 20 MG CR capsule    priLOSEC    30 capsule    Take 1 capsule (20 mg) by mouth daily    Gastroesophageal reflux disease without esophagitis       zolpidem 6.25 MG CR tablet    AMBIEN CR    60 tablet    Take 1 tablet (6.25 mg) by mouth nightly as needed for sleep    Primary insomnia

## 2018-11-07 NOTE — PROGRESS NOTES
"S: Melissa Benavidez is a 32 year old female who presents to clinic reporting constant vaginal bleeding since 9/1/18. Currently uses DMPA for birth control and has been on it for the past 7 years since the birth of her son. She reports having spotting a couple of weeks prior to her next injection, but then it resolves. She received her last depo on 9/17/18. She reports the bleeding vacillates between bright red bleeding and then brown spotting every couple of days. She does not report the bleeding as heavy, but has been using a tampon. She reports that she inserted one tampon 1-2 weeks ago, forgot about it, and inserted an additional one. She is unsure how long the first tampon remained in her vagina before it was removed. She is confident she was able to remove both tampons, however. She denies unusual vaginal discharge or itching, but does report an odor. Denies dysuria or changes in bowel movements, but does report recent bloating that is uncomfortable. Has not had recent STI screening and reports her last pap was NIL but positive for HPV, not types 16 or 18.     O: /70  Pulse 88  Ht 5' 4\" (1.626 m)  Wt 143 lb (64.9 kg)  Breastfeeding? No  BMI 24.55 kg/m2   Pelvic exam: External genitalia unremarkable, BUS normal. Speculum exam revealed small amount of bright red blood noted at cervical os, mild erythema, minimal discharge. Slight fishy odor noted on exam. No residual tampon remnants visualized.  Bimanual exam: No cervical motion tenderness, uterus and adnexa within normal limits. Ovaries not palpable. Unable to palpate any foreign objects within the vagina.    A:   Encounter Diagnoses   Name Primary?     Vaginal bleeding Yes     Bacterial vaginosis      Encounter for management and injection of depo-Provera          P: Orders placed for GC/CT and wet prep. Wet prep positive for clue cells.   Patient prefers metrogel prescription.  Additionally spoke about NSAID therapy for the bleeding she is " currently experiencing. Patient will plan to take 800mg of ibuprofen every 8 hours for 5-7 days.  Return to clinic if problem persists or gets worse.   Discussed long-term use of DMPA. Patient was unaware of of FDA Black Box Warning of 5 year use. Discussed other alternative methods of contraception that likely would cause amenorrhea including, IUD, Nexplanon, POPs, and contiuous COCs. Patient has been very satisfied with DMPA and would like to continue its use, but she is agreeable to a DEXA scan to assess BMD. Future birth control decisions to be made pending the results of that scan. Counseled her on her additional risk factor of smoking and encouraged to begin taking a calcium supplement.    Carmen Villanueva CNM on 11/7/2018 at 5:52 PM

## 2018-11-08 ENCOUNTER — TELEPHONE (OUTPATIENT)
Dept: OBGYN | Facility: CLINIC | Age: 33
End: 2018-11-08

## 2018-11-08 RX ORDER — METRONIDAZOLE 7.5 MG/G
1 GEL VAGINAL DAILY
Qty: 25 G | Refills: 0
Start: 2018-11-08 | End: 2018-11-13

## 2018-11-08 NOTE — TELEPHONE ENCOUNTER
Patient returned phone call to discuss test results and contraception counseling. Please see office visit note on 11/8/18 for plan of care.    Carmen Villanueva CNM on 11/8/2018 at 4:36 PM

## 2018-11-08 NOTE — TELEPHONE ENCOUNTER
Called patient informing her that her test results had come back and to call the clinic to discuss. Will attempt to reach the patient tomorrow.    Carmen Villanueva CNM on 11/8/2018 at 12:29 PM

## 2018-11-09 LAB
C TRACH DNA SPEC QL NAA+PROBE: NEGATIVE
N GONORRHOEA DNA SPEC QL NAA+PROBE: NEGATIVE
SPECIMEN SOURCE: NORMAL
SPECIMEN SOURCE: NORMAL

## 2018-11-12 ENCOUNTER — TELEPHONE (OUTPATIENT)
Dept: BONE DENSITY | Facility: CLINIC | Age: 33
End: 2018-11-12

## 2018-11-28 ENCOUNTER — TELEPHONE (OUTPATIENT)
Dept: INTERNAL MEDICINE | Facility: CLINIC | Age: 33
End: 2018-11-28

## 2018-11-28 DIAGNOSIS — Z79.899 CONTROLLED SUBSTANCE AGREEMENT SIGNED: ICD-10-CM

## 2018-11-28 DIAGNOSIS — F90.1 ATTENTION-DEFICIT HYPERACTIVITY DISORDER, PREDOMINANTLY HYPERACTIVE TYPE: ICD-10-CM

## 2018-11-28 NOTE — TELEPHONE ENCOUNTER
Please place prescription in the [facility] pharmacy folder.    Adderall      Last Written Prescription Date:  10-24-18  Last Fill Quantity: 60,   # refills: 0  Last Office Visit: 5-8-18  Future Office visit:       Routing refill request to provider for review/approval because:  Drug not on the [facility], UMP or  Health refill protocol or controlled substance    Signed CSA form in chart.    RX monitoring program ([place]NPMP) reviewed:  reviewed- no concerns    MNPMP profile:  [email or web address]    Please advise, thanks.

## 2018-11-28 NOTE — TELEPHONE ENCOUNTER
Reason for Call:  Medication or medication refill:    Do you use a [facility]?  Name of the pharmacy and phone number for the current request:  [facility] walk over    Name of the medication requested: adderall    Other request no    Can we leave a detailed message on this number? YES    Phone number patient can be reached at: Home number on file [phone] (home)    Best Time: any    Call taken on 11/28/2018 at 3:12 PM by Fiona Adame

## 2018-11-30 RX ORDER — DEXTROAMPHETAMINE SACCHARATE, AMPHETAMINE ASPARTATE, DEXTROAMPHETAMINE SULFATE AND AMPHETAMINE SULFATE 5; 5; 5; 5 MG/1; MG/1; MG/1; MG/1
20 TABLET ORAL 2 TIMES DAILY
Qty: 60 TABLET | Refills: 0 | Status: SHIPPED | OUTPATIENT
Start: 2018-11-30 | End: 2018-12-21

## 2018-12-04 ENCOUNTER — NURSE TRIAGE (OUTPATIENT)
Dept: NURSING | Facility: CLINIC | Age: 33
End: 2018-12-04

## 2018-12-04 NOTE — TELEPHONE ENCOUNTER
Transferred to scheduling to set up nurse only appointment for depo injection.  [facility]

## 2018-12-05 ENCOUNTER — ALLIED HEALTH/NURSE VISIT (OUTPATIENT)
Dept: NURSING | Facility: CLINIC | Age: 33
End: 2018-12-05
Payer: COMMERCIAL

## 2018-12-05 VITALS — DIASTOLIC BLOOD PRESSURE: 76 MMHG | SYSTOLIC BLOOD PRESSURE: 124 MMHG | WEIGHT: 142.7 LBS | BODY MASS INDEX: 24.49 KG/M2

## 2018-12-05 DIAGNOSIS — Z30.42 ENCOUNTER FOR DEPO-PROVERA CONTRACEPTION: Primary | ICD-10-CM

## 2018-12-05 PROCEDURE — 99207 ZZC NO CHARGE NURSE ONLY: CPT

## 2018-12-05 PROCEDURE — 96372 THER/PROPH/DIAG INJ SC/IM: CPT

## 2018-12-05 RX ORDER — MEDROXYPROGESTERONE ACETATE 150 MG/ML
150 INJECTION, SUSPENSION INTRAMUSCULAR
Qty: 1 ML | Refills: 0 | OUTPATIENT
Start: 2018-12-05

## 2018-12-05 NOTE — MR AVS SNAPSHOT
After Visit Summary   12/5/2018    Melissa Benavidez    MRN: [id number]           Patient Information     Date Of Birth          1985        Visit Information        Provider Department      12/5/2018 3:30 PM RI OB NURSE [facility]        Today's Diagnoses     Contraception    -  1       Follow-ups after your visit        Your next 10 appointments already scheduled     Jan 02, 2019  5:00 PM CST   DX HIP/PELVIS/SPINE with RIDX1   [facility]    [address]  [address]  [facility] [address] [phone]           How do I prepare for my exam? (Food and drink instructions) No Food and Drink Restrictions.  How do I prepare for my exam? (Other instructions) Please do not take any of the following 24 hours prior to the day of your exam: vitamins, calcium tablets, antacids.  What should I wear: If possible, please wear clothes without metal (snaps, zippers). A sweat suit works well.  How long does the exam take: The exam takes about 20 minutes.  What should I bring: Bring a list of your current medicines to your exam (including vitamins, minerals and over-the-counter drugs).  Do I need a :  No  is needed.  What should I do after the exam: No restrictions, You may resume normal activities.  How do I prepare for my exam? (Food and drink instructions) A DEXA scan is a bone-density scan. It uses a low level of radiation to check the strength of your bones. As you lie on a padded table, a machine will take X-rays. We most often scan the hips and lower spine.  Who should I call with questions: If you have any questions, please call the Imaging Department where you will have your exam. Directions, parking instructions, and other information is available on our website, [email or web address].              Who to contact     If you have questions or need follow up information about today's clinic visit or your  schedule please contact [facility] directly at [phone].  Normal or non-critical lab and imaging results will be communicated to you by MyChart, letter or phone within 4 business days after the clinic has received the results. If you do not hear from us within 7 days, please contact the clinic through MyChart or phone. If you have a critical or abnormal lab result, we will notify you by phone as soon as possible.  Submit refill requests through [employer] or call your pharmacy and they will forward the refill request to us. Please allow 3 business days for your refill to be completed.          Additional Information About Your Visit        Care EveryWhere ID     This is your Care EveryWhere ID. This could be used by other organizations to access your [place] medical records  [id number]        Your Vitals Were     Breastfeeding? BMI (Body Mass Index)                No 24.49 kg/m2           Blood Pressure from Last 3 Encounters:   12/05/18 124/76   11/07/18 116/70   09/17/18 114/76    Weight from Last 3 Encounters:   12/05/18 142 lb 11.2 oz (64.7 kg)   11/07/18 143 lb (64.9 kg)   09/17/18 143 lb (64.9 kg)              We Performed the Following     Medroxyprogesterone inj/1mg (Depo Provera J-Code)          Where to get your medicines      Some of these will need a paper prescription and others can be bought over the counter.  Ask your nurse if you have questions.     You don't need a prescription for these medications     medroxyPROGESTERone 150 MG/ML IM injection          Primary Care Provider Office Phone # Fax #    Franca Mandujano [phone]       [address] [facility] [address]        Equal Access to Services     ELISE JEFFERSON : Hadii fariba [place], walauren brody, qaybta kaalbrad tomlinson. So [facility] [phone].    ATENCIÓN: Si habla español, tiene a matthews disposición servicios gratuitos de asistencia  lingüística. Kasia al [phone].    We comply with applicable federal civil rights laws and [place] laws. We do not discriminate on the basis of race, color, national origin, age, disability, sex, sexual orientation, or gender identity.            Thank you!     Thank you for choosing [facility]  for your care. Our goal is always to provide you with excellent care. Hearing back from our patients is one way we can continue to improve our services. Please take a few minutes to complete the written survey that you may receive in the mail after your visit with us. Thank you!             Your Updated Medication List - Protect others around you: Learn how to safely use, store and throw away your medicines at www.disposemymeds.org.          This list is accurate as of 12/5/18  4:00 PM.  Always use your most recent med list.                   Brand Name Dispense Instructions for use Diagnosis    amoxicillin 500 MG capsule    AMOXIL     TAKE 1 CAPSULES EVERY 8 HOURS (3 TIMES PER DAY) UNTIL GONE        amphetamine-dextroamphetamine 20 MG tablet    ADDERALL    60 tablet    Take 1 tablet (20 mg) by mouth 2 times daily    Attention-deficit hyperactivity disorder, predominantly hyperactive type, Controlled substance agreement signed       buPROPion 150 MG 24 hr tablet    WELLBUTRIN XL    90 tablet    TAKE 1 TABLET (150 MG) BY MOUTH EVERY MORNING    Anxiety       FLUoxetine 10 MG capsule    PROzac    90 capsule    TAKE 1 CAPSULE (10 MG) BY MOUTH DAILY    Anxiety       hydrocortisone 1 % external cream    CORTAID    30 g    Apply sparingly to affected area three times daily for 14 days.    Bug bite, initial encounter       ibuprofen 200 MG tablet    ADVIL/MOTRIN    30 tablet    Take 3 tablets by mouth every 8 hours as needed for pain.        medroxyPROGESTERone 150 MG/ML IM injection    DEPO-PROVERA    1 mL    Inject 1 mL (150 mg) into the muscle every 3 months    Encounter for Depo-Provera contraception        omeprazole 20 MG DR capsule    priLOSEC    30 capsule    Take 1 capsule (20 mg) by mouth daily    Gastroesophageal reflux disease without esophagitis       zolpidem ER 6.25 MG CR tablet    AMBIEN CR    60 tablet    Take 1 tablet (6.25 mg) by mouth nightly as needed for sleep    Primary insomnia

## 2018-12-05 NOTE — PROGRESS NOTES
Patient requesting Depo today. Was counseled on long term use by Carmen Villanueva CNM. Instructed on black box warning and calcium supplementation. Patient has Dexa scan ordered. Order for x1 dose Depo placed per patient request. SHADI Mancilla, MAGGIM

## 2018-12-05 NOTE — PROGRESS NOTES
Follow Up Injection    Patient returning during stated date range given at previous visit: Yes      If here at the correct interval:   BP Readings from Last 1 Encounters:   12/05/18 124/76     Wt Readings from Last 1 Encounters:   12/05/18 142 lb 11.2 oz (64.7 kg)       Last Pap/exam date: 6/26/2108      Side effects or problems with last injection?  No.  Date range is given to patient for next dose: 2/20/2019-3/6/2019    See Medication Note for administration information    Staff Sig: Ivy Daniel CMA on 12/5/2018 at 3:53 PM

## 2018-12-18 ENCOUNTER — TELEPHONE (OUTPATIENT)
Dept: INTERNAL MEDICINE | Facility: CLINIC | Age: 33
End: 2018-12-18

## 2018-12-18 DIAGNOSIS — F51.01 PRIMARY INSOMNIA: ICD-10-CM

## 2018-12-18 DIAGNOSIS — F90.1 ATTENTION-DEFICIT HYPERACTIVITY DISORDER, PREDOMINANTLY HYPERACTIVE TYPE: ICD-10-CM

## 2018-12-18 DIAGNOSIS — Z79.899 CONTROLLED SUBSTANCE AGREEMENT SIGNED: ICD-10-CM

## 2018-12-18 NOTE — TELEPHONE ENCOUNTER
Reason for Call:  Medication or medication refill:    Do you use a [facility]?  Name of the pharmacy and phone number for the current request:       [facility] [address] [address] [place]    Name of the medication requested: adderall and ambein    Other request: na    Can we leave a detailed message on this number? YES    Phone number patient can be reached at: Home number on file [phone] (home)    Best Time: any    Call taken on 12/18/2018 at 3:28 PM by Ramona Tomas

## 2018-12-21 RX ORDER — ZOLPIDEM TARTRATE 6.25 MG/1
6.25 TABLET, FILM COATED, EXTENDED RELEASE ORAL
Qty: 60 TABLET | Refills: 0 | Status: SHIPPED | OUTPATIENT
Start: 2018-12-21 | End: 2019-03-12

## 2018-12-21 RX ORDER — DEXTROAMPHETAMINE SACCHARATE, AMPHETAMINE ASPARTATE, DEXTROAMPHETAMINE SULFATE AND AMPHETAMINE SULFATE 5; 5; 5; 5 MG/1; MG/1; MG/1; MG/1
20 TABLET ORAL 2 TIMES DAILY
Qty: 60 TABLET | Refills: 0 | Status: SHIPPED | OUTPATIENT
Start: 2018-12-21 | End: 2019-02-04

## 2018-12-21 NOTE — TELEPHONE ENCOUNTER
Fill at [facility].   Adderall request is 1 week early.     Requested Prescriptions   Pending Prescriptions Disp Refills     zolpidem ER (AMBIEN CR) 6.25 MG CR tablet  Last Written Prescription Date:  10/24/18  Last Fill Quantity: 60,  # refills: 0   Last office visit: 5/8/2018 with prescribing provider:  Dr Mandujano  Future Office Visit:    60 tablet 0     Sig: Take 1 tablet (6.25 mg) by mouth nightly as needed for sleep    There is no refill protocol information for this order        amphetamine-dextroamphetamine (ADDERALL) 20 MG tablet  Last Written Prescription Date:  11/30/18  Last Fill Quantity: 60,  # refills: 0   Last office visit: 5/8/2018 with prescribing provider:  Dr Mandujano   Future Office Visit:    60 tablet 0     Sig: Take 1 tablet (20 mg) by mouth 2 times daily    There is no refill protocol information for this order      Signed CSA on file.   RX [place] ([place]) reviewed:  reviewed- no concerns    [place]  [email or web address]    Routing refill request to provider for review/approval because:  Drug not on the [facility] refill protocol

## 2018-12-24 ENCOUNTER — TELEPHONE (OUTPATIENT)
Dept: INTERNAL MEDICINE | Facility: CLINIC | Age: 33
End: 2018-12-24

## 2018-12-24 ENCOUNTER — ANCILLARY PROCEDURE (OUTPATIENT)
Dept: GENERAL RADIOLOGY | Facility: CLINIC | Age: 33
End: 2018-12-24
Attending: INTERNAL MEDICINE
Payer: COMMERCIAL

## 2018-12-24 ENCOUNTER — OFFICE VISIT (OUTPATIENT)
Dept: INTERNAL MEDICINE | Facility: CLINIC | Age: 33
End: 2018-12-24
Payer: COMMERCIAL

## 2018-12-24 VITALS
RESPIRATION RATE: 12 BRPM | OXYGEN SATURATION: 100 % | DIASTOLIC BLOOD PRESSURE: 80 MMHG | HEART RATE: 79 BPM | SYSTOLIC BLOOD PRESSURE: 120 MMHG | HEIGHT: 64 IN | TEMPERATURE: 98.3 F | BODY MASS INDEX: 23.9 KG/M2 | WEIGHT: 140 LBS

## 2018-12-24 DIAGNOSIS — F90.9 ATTENTION DEFICIT HYPERACTIVITY DISORDER (ADHD), UNSPECIFIED ADHD TYPE: ICD-10-CM

## 2018-12-24 DIAGNOSIS — M79.672 LEFT FOOT PAIN: ICD-10-CM

## 2018-12-24 DIAGNOSIS — F32.0 MILD MAJOR DEPRESSION (H): ICD-10-CM

## 2018-12-24 DIAGNOSIS — M79.672 LEFT FOOT PAIN: Primary | ICD-10-CM

## 2018-12-24 PROCEDURE — 73630 X-RAY EXAM OF FOOT: CPT | Mod: LT

## 2018-12-24 PROCEDURE — 99214 OFFICE O/P EST MOD 30 MIN: CPT | Performed by: INTERNAL MEDICINE

## 2018-12-24 ASSESSMENT — PATIENT HEALTH QUESTIONNAIRE - PHQ9: SUM OF ALL RESPONSES TO PHQ QUESTIONS 1-9: 5

## 2018-12-24 ASSESSMENT — MIFFLIN-ST. JEOR: SCORE: 1335.04

## 2018-12-24 NOTE — PROGRESS NOTES
"   SUBJECTIVE:   CC: Melissa Benavidez is an 33 year old woman who presents for preventive health visit.     Healthy Habits:    Do you get at least three servings of calcium containing foods daily (dairy, green leafy vegetables, etc.)? { :925564::\"yes\"}    Amount of exercise or daily activities, outside of work: { :243550}    Problems taking medications regularly { :993130::\"No\"}    Medication side effects: { :296801::\"No\"}    Have you had an eye exam in the past two years? { :884746}    Do you see a dentist twice per year? { :255314}    Do you have sleep apnea, excessive snoring or daytime drowsiness?{ :234985}  {Outside tests to abstract? :636326}    {additional problems to add (Optional):753073}    Today's PHQ-2 Score:   PHQ-2 (  [employer]) 1/10/2017 2016   Q1: Little interest or pleasure in doing things 0 0   Q2: Feeling down, depressed or hopeless 0 0   PHQ-2 Score 0 0     {PHQ-2 LOOK IN ASSESSMENTS (Optional) :273208}  Abuse: Current or Past(Physical, Sexual or Emotional)- {YES/NO/NA:322736}  Do you feel safe in your environment? {YES/NO/NA:471514}    Social History     Tobacco Use     Smoking status: Current Every Day Smoker     Packs/day: 0.50     Years: 3.00     Pack years: 1.50     Last attempt to quit: 2012     Years since quittin.9     Smokeless tobacco: Never Used     Tobacco comment: 5 -10 cigarrets daily   Substance Use Topics     Alcohol use: Yes     Alcohol/week: 0.0 oz     Comment: 1-2 drinks/week     If you drink alcohol do you typically have >3 drinks per day or >7 drinks per week? {ETOH :698806}                     Reviewed orders with patient.  Reviewed health maintenance and updated orders accordingly - {Yes/No:984989::\"Yes\"}  {Chronicprobdata (Optional):826786}    {Mammo Decision Support (Optional):997626}    Pertinent mammograms are reviewed under the imaging tab.  History of abnormal Pap smear: {PAP HX:597371}  PAP / HPV Latest Ref Rng & Units 2018 " "  PAP - NIL NIL NIL   HPV 16 DNA NEG:Negative Negative - -   HPV 18 DNA NEG:Negative Negative - -   OTHER HR HPV NEG:Negative Positive(A) - -     Reviewed and updated as needed this visit by clinical staff  Tobacco  Allergies  Meds  Problems  Med Hx  Surg Hx  Fam Hx  Soc Hx          Reviewed and updated as needed this visit by Provider  Meds  Problems        {HISTORY OPTIONS (Optional):160544}    ROS:  { :532891}    OBJECTIVE:   /80   Pulse 79   Temp 98.3  F (36.8  C) (Oral)   Resp 12   Ht 1.626 m (5' 4\")   Wt 63.5 kg (140 lb)   SpO2 100%   BMI 24.03 kg/m    EXAM:  {Exam Choices:443571}    {Diagnostic Test Results (Optional):617414::\"Diagnostic Test Results:\",\"none \"}    ASSESSMENT/PLAN:   {Diag Picklist:519254}    COUNSELING:   {FEMALE COUNSELING MESSAGES:132316::\"Reviewed preventive health counseling, as reflected in patient instructions\"}    BP Readings from Last 1 Encounters:   12/24/18 120/80     Estimated body mass index is 24.03 kg/m  as calculated from the following:    Height as of this encounter: 1.626 m (5' 4\").    Weight as of this encounter: 63.5 kg (140 lb).    {BP Counseling- Complete if BP >= 120/80  (Optional):621587}  {Weight Management Plan (ACO) Complete if BMI is abnormal-  Ages 18-64  BMI >24.9.  Age 65+ with BMI <23 or >30 (Optional):874431}     reports that she has been smoking.  She has a 1.50 pack-year smoking history. she has never used smokeless tobacco.  {Tobacco Cessation -- Complete if patient is a smoker (Optional):471241}    Counseling Resources:  ATP IV Guidelines  Pooled Cohorts Equation Calculator  Breast Cancer Risk Calculator  FRAX Risk Assessment  ICSI Preventive Guidelines  Dietary Guidelines for Americans, 2010  USDA's MyPlate  ASA Prophylaxis  Lung CA Screening    Frnaca Mandujano MD  [facility]  "

## 2018-12-24 NOTE — PROGRESS NOTES
"  SUBJECTIVE:   Melissa Benavidez is a 33 year old female who presents to clinic today for the following health issues:    ADHD, ambien recheck    ADHD.  She reports that adderall helps her stay focus at home and work. No regular exercise.  She is not sleeping well.   She works at [employer].     Insomnia.  Does not drink caffeine after noon time.  Ambien does help her sleep at night- takes 1-2 times per week and uses when working overtime.      Left toe.  Yesterday afternoon she stubbed her toe.  Her pinky toe has been red and swollen.     Depression Followup    Status since last visit: Stable     See PHQ-9 for current symptoms.  Other associated symptoms: None    Complicating factors:   Significant life event:  No   Current substance abuse:  None  Anxiety or Panic symptoms:  No    PHQ 11/24/2015 6/6/2016 5/8/2018   PHQ-9 Total Score 12 5 6   Q9: Suicide Ideation Not at all Not at all Not at all       PHQ-9  English  PHQ-9   Any Language  Suicide Assessment Five-step Evaluation and Treatment (SAFE-T)    Amount of exercise or physical activity: None    Problems taking medications regularly: No    Medication side effects: none    Diet: regular (no restrictions)        PROBLEMS TO ADD ON...    Problem list and histories reviewed & adjusted, as indicated.  Additional history: as documented    Labs reviewed in EPIC    Reviewed and updated as needed this visit by clinical staff  Tobacco  Allergies  Meds  Med Hx  Surg Hx  Fam Hx  Soc Hx      Reviewed and updated as needed this visit by Provider         ROS:  CONSTITUTIONAL: NEGATIVE for fever, chills, change in weight  RESP: NEGATIVE for significant cough or SOB  CV: NEGATIVE for chest pain, palpitations or peripheral edema  Psych: depression and ADHD    OBJECTIVE:     /80   Pulse 79   Temp 98.3  F (36.8  C) (Oral)   Resp 12   Ht 1.626 m (5' 4\")   Wt 63.5 kg (140 lb)   SpO2 100%   BMI 24.03 kg/m    Body mass index is 24.03 kg/m .  GENERAL: " Chief Complaint   Patient presents with     RECHECK     atopic dermatitis follow up      Wt 30 lb 3.3 oz (13.7 kg)    Cathy Wood LPN     healthy, alert and no distress  NECK: no adenopathy, no asymmetry, masses, or scars and thyroid normal to palpation  RESP: lungs clear to auscultation - no rales, rhonchi or wheezes  CV: regular rate and rhythm, normal S1 S2, no S3 or S4, no murmur  Psych: normal affect  MSK: left 5th digit with pain upon moving toe; erythema and swelling appreciated      ASSESSMENT/PLAN:       (M79.672) Left foot pain  (primary encounter diagnosis)  Comment: assess  Plan: XR Foot Left G/E 3 Views            (F32.0) Mild major depression (H)  Comment: stable  Plan: continue on current regimen    (F90.9) Attention deficit hyperactivity disorder (ADHD), unspecified ADHD type  Comment: stable  Plan: continue on current regimen        Franca Mandujano MD  [facility]

## 2018-12-24 NOTE — LETTER
To whom it may concern:    Melissa Benavidez is a patient under my care.  She was seen in the office on December 24, 2018.   Please allow her to be on light duty until January 2, 2018 due to a non work-related injury.    Please call with any questions.      Franca Mandujano MD

## 2018-12-24 NOTE — NURSING NOTE
"/80   Pulse 79   Temp 98.3  F (36.8  C) (Oral)   Resp 12   Ht 1.626 m (5' 4\")   Wt 63.5 kg (140 lb)   SpO2 100%   BMI 24.03 kg/m      "

## 2019-02-04 DIAGNOSIS — Z79.899 CONTROLLED SUBSTANCE AGREEMENT SIGNED: ICD-10-CM

## 2019-02-04 DIAGNOSIS — F90.1 ATTENTION-DEFICIT HYPERACTIVITY DISORDER, PREDOMINANTLY HYPERACTIVE TYPE: ICD-10-CM

## 2019-02-04 RX ORDER — DEXTROAMPHETAMINE SACCHARATE, AMPHETAMINE ASPARTATE, DEXTROAMPHETAMINE SULFATE AND AMPHETAMINE SULFATE 5; 5; 5; 5 MG/1; MG/1; MG/1; MG/1
20 TABLET ORAL 2 TIMES DAILY
Qty: 60 TABLET | Refills: 0 | Status: SHIPPED | OUTPATIENT
Start: 2019-02-04 | End: 2019-03-12

## 2019-02-04 NOTE — TELEPHONE ENCOUNTER
Reason for Call:  Medication or medication refill:    Do you use a [facility]?  Name of the pharmacy and phone number for the current request:  [facility]    Name of the medication requested: ADDERALL  Ambien    Other request: 2 days left per pt.     Can we leave a detailed message on this number? YES    Phone number patient can be reached at: Cell number on file:    Telephone Information:   Mobile [phone]       Best Time: afternoons    Call taken on 2/4/2019 at 10:14 AM by Hardik Braun

## 2019-02-04 NOTE — TELEPHONE ENCOUNTER
Requested Prescriptions   Pending Prescriptions Disp Refills     amphetamine-dextroamphetamine (ADDERALL) 20 MG tablet  Problem List Complete:  No     PROVIDER TO CONSIDER COMPLETION OF PROBLEM LIST AND OVERVIEW/CONTROLLED SUBSTANCE AGREEMENT    Last Written Prescription Date:  12/21/18  Last Fill Quantity: 60,   # refills: 0    Last Office Visit with [facility] primary care provider: 12/24/18    Future Office visit:     Controlled substance agreement:   Encounter-Level CSA - 06/06/2016:    Controlled Substance Agreement - Scan on 6/7/2016  8:22 AM: [place] CONTROLLED SUBSTANCE AGREEMENT, 6/6/16 (below)       Patient-Level CSA:    There are no patient-level csa.         Last Urine Drug Screen: No results found for: CDAUT, No results found for: COMDAT, No results found for: THC13, PCP13, COC13, MAMP13, OPI13, AMP13, BZO13, TCA13, MTD13, BAR13, OXY13, PPX13, BUP13     Processing:  Staff will hand deliver Rx to on-site pharmacy     [email or web address]       checked in past 3 months?  Yes done 12/21/18       60 tablet 0     Sig: Take 1 tablet (20 mg) by mouth 2 times daily    There is no refill protocol information for this order    Routing refill request to provider for review/approval because:  Drug not on the [facility] refill protocol

## 2019-03-11 DIAGNOSIS — Z79.899 CONTROLLED SUBSTANCE AGREEMENT SIGNED: ICD-10-CM

## 2019-03-11 DIAGNOSIS — F51.01 PRIMARY INSOMNIA: ICD-10-CM

## 2019-03-11 DIAGNOSIS — F90.1 ATTENTION-DEFICIT HYPERACTIVITY DISORDER, PREDOMINANTLY HYPERACTIVE TYPE: ICD-10-CM

## 2019-03-11 NOTE — TELEPHONE ENCOUNTER
Reason for Call:  Medication or medication refill:    Do you use a [facility]?  Name of the pharmacy and phone number for the current request: [facility]   Name of the medication requested: Adderall and Ambien    Other request: Call and let know when ready    Can we leave a detailed message on this number? YES    Phone number patient can be reached at: [phone]  Best Time: Any    Call taken on 3/11/2019 at 3:28 PM by Kirstie Pretty

## 2019-03-12 RX ORDER — DEXTROAMPHETAMINE SACCHARATE, AMPHETAMINE ASPARTATE, DEXTROAMPHETAMINE SULFATE AND AMPHETAMINE SULFATE 5; 5; 5; 5 MG/1; MG/1; MG/1; MG/1
20 TABLET ORAL 2 TIMES DAILY
Qty: 60 TABLET | Refills: 0 | Status: SHIPPED | OUTPATIENT
Start: 2019-03-12 | End: 2019-04-03

## 2019-03-12 RX ORDER — ZOLPIDEM TARTRATE 6.25 MG/1
6.25 TABLET, FILM COATED, EXTENDED RELEASE ORAL
Qty: 60 TABLET | Refills: 0 | Status: SHIPPED | OUTPATIENT
Start: 2019-03-12 | End: 2019-05-06

## 2019-03-12 NOTE — TELEPHONE ENCOUNTER
Requested Prescriptions   Pending Prescriptions Disp Refills     zolpidem ER (AMBIEN CR) 6.25 MG CR tablet  Last Written Prescription Date:  12/21/18  Last Fill Quantity: 60,  # refills: 0   Last office visit: 12/24/18 with prescribing provider:  Delbert   Future Office Visit:    60 tablet 0     Sig: Take 1 tablet (6.25 mg) by mouth nightly as needed for sleep    There is no refill protocol information for this order         Controlled Substance Refill Request for Adderall 20 mg  Problem List Complete:  No     PROVIDER TO CONSIDER COMPLETION OF PROBLEM LIST AND OVERVIEW/CONTROLLED SUBSTANCE AGREEMENT    Last Written Prescription Date:  2/4/19  Last Fill Quantity: 60,   # refills: 0    THE MOST RECENT OFFICE VISIT MUST BE WITHIN THE PAST 3 MONTHS. AT LEAST ONE FACE TO FACE VISIT MUST OCCUR EVERY 6 MONTHS. ADDITIONAL VISITS CAN BE VIRTUAL.  (THIS STATEMENT SHOULD BE DELETED.)    Last Office Visit with [facility] primary care provider: 12/24/18    Future Office visit:     Controlled substance agreement:   Encounter-Level CSA - 06/06/2016:    Controlled Substance Agreement - Scan on 6/7/2016  8:22 AM: [place] CONTROLLED SUBSTANCE AGREEMENT, 6/6/16 (below)       Patient-Level CSA:    There are no patient-level csa.         Last Urine Drug Screen: No results found for: CDAUT, No results found for: COMDAT, No results found for: THC13, PCP13, COC13, MAMP13, OPI13, AMP13, BZO13, TCA13, MTD13, BAR13, OXY13, PPX13, BUP13     Processing:  Staff will hand deliver Rx to on-site pharmacy     [email or web address]    RX monitoring program ([place]) reviewed:  reviewed- no concerns    Routing refill request to provider for review/approval because:  Drug not on the [facility] refill protocol

## 2019-03-13 NOTE — TELEPHONE ENCOUNTER
Both Rx's received and placed in blue folder to be sent to [facility]. Left detailed message for patient per consent.  Jessica Lozano CMA

## 2019-04-03 DIAGNOSIS — Z79.899 CONTROLLED SUBSTANCE AGREEMENT SIGNED: ICD-10-CM

## 2019-04-03 DIAGNOSIS — F90.1 ATTENTION-DEFICIT HYPERACTIVITY DISORDER, PREDOMINANTLY HYPERACTIVE TYPE: ICD-10-CM

## 2019-04-03 RX ORDER — DEXTROAMPHETAMINE SACCHARATE, AMPHETAMINE ASPARTATE, DEXTROAMPHETAMINE SULFATE AND AMPHETAMINE SULFATE 5; 5; 5; 5 MG/1; MG/1; MG/1; MG/1
20 TABLET ORAL 2 TIMES DAILY
Qty: 60 TABLET | Refills: 0 | Status: SHIPPED | OUTPATIENT
Start: 2019-04-03 | End: 2019-05-06

## 2019-04-03 NOTE — TELEPHONE ENCOUNTER
Controlled Substance Refill Request for Adderall  Problem List Complete:  No     PROVIDER TO CONSIDER COMPLETION OF PROBLEM LIST AND OVERVIEW/CONTROLLED SUBSTANCE AGREEMENT    Last Written Prescription Date:  3-12-19  Last Fill Quantity: 60,   # refills: 0    THE MOST RECENT OFFICE VISIT MUST BE WITHIN THE PAST 3 MONTHS. AT LEAST ONE FACE TO FACE VISIT MUST OCCUR EVERY 6 MONTHS. ADDITIONAL VISITS CAN BE VIRTUAL.  (THIS STATEMENT SHOULD BE DELETED.)    Last Office Visit with [facility] primary care provider: 12-24-18    Future Office visit:     Controlled substance agreement:   Encounter-Level CSA - 06/06/2016:    Controlled Substance Agreement - Scan on 6/7/2016  8:22 AM: [place] CONTROLLED SUBSTANCE AGREEMENT, 6/6/16 (below)       Patient-Level CSA:    There are no patient-level csa.         Last Urine Drug Screen: No results found for: CDAUT, No results found for: COMDAT, No results found for: THC13, PCP13, COC13, MAMP13, OPI13, AMP13, BZO13, TCA13, MTD13, BAR13, OXY13, PPX13, BUP13     Processing:  Staff will hand deliver Rx to on-site pharmacy --[facility]    [email or web address]       checked in past 3 months?  Yes 4-3-19   Medication last dispensed to patient was 3-13-19 #60 tabs.    Please advise, thanks.

## 2019-04-03 NOTE — TELEPHONE ENCOUNTER
Reason for Call:  Medication or medication refill:    Do you use a [facility]?  Name of the pharmacy and phone number for the current request:  [place] [address] [facility] (SPECIALTY) - [phone]    Name of the medication requested: adderall    Other request: none    Can we leave a detailed message on this number? YES    Phone number patient can be reached at: Home number on file [phone] (home)    Best Time: any    Call taken on 4/3/2019 at 10:37 AM by Milly Loyola

## 2019-05-06 DIAGNOSIS — F90.1 ATTENTION-DEFICIT HYPERACTIVITY DISORDER, PREDOMINANTLY HYPERACTIVE TYPE: ICD-10-CM

## 2019-05-06 DIAGNOSIS — F51.01 PRIMARY INSOMNIA: ICD-10-CM

## 2019-05-06 DIAGNOSIS — Z79.899 CONTROLLED SUBSTANCE AGREEMENT SIGNED: ICD-10-CM

## 2019-05-06 RX ORDER — ZOLPIDEM TARTRATE 6.25 MG/1
6.25 TABLET, FILM COATED, EXTENDED RELEASE ORAL
Qty: 60 TABLET | Refills: 0 | Status: SHIPPED | OUTPATIENT
Start: 2019-05-06 | End: 2019-06-25

## 2019-05-06 RX ORDER — DEXTROAMPHETAMINE SACCHARATE, AMPHETAMINE ASPARTATE, DEXTROAMPHETAMINE SULFATE AND AMPHETAMINE SULFATE 5; 5; 5; 5 MG/1; MG/1; MG/1; MG/1
20 TABLET ORAL 2 TIMES DAILY
Qty: 60 TABLET | Refills: 0 | Status: SHIPPED | OUTPATIENT
Start: 2019-05-06 | End: 2019-06-25

## 2019-05-06 NOTE — TELEPHONE ENCOUNTER
Adderall    ([facility])  Last Written Prescription Date:  4/30/19  Last Fill Quantity: 60,   # refills: 0    Ambien    ([facility])  Last Written Prescription Date:  3/12/19  Last Fill Quantity: 60,   # refills: 0  Last Office Visit: 12/24/18  Future Office visit:       Routing refill request to provider for review/approval because:  Drug not on the [facility], [facility] or  Health refill protocol or controlled substance  Writer is not authorized to check  for primary care provider

## 2019-05-06 NOTE — TELEPHONE ENCOUNTER
Patient calling for a refill of Adderall and Ambien  Please send to [facility] when ready    [phone] ok to call and Lm

## 2019-06-25 DIAGNOSIS — F51.01 PRIMARY INSOMNIA: ICD-10-CM

## 2019-06-25 DIAGNOSIS — F90.1 ATTENTION-DEFICIT HYPERACTIVITY DISORDER, PREDOMINANTLY HYPERACTIVE TYPE: ICD-10-CM

## 2019-06-25 DIAGNOSIS — Z79.899 CONTROLLED SUBSTANCE AGREEMENT SIGNED: ICD-10-CM

## 2019-06-25 RX ORDER — DEXTROAMPHETAMINE SACCHARATE, AMPHETAMINE ASPARTATE, DEXTROAMPHETAMINE SULFATE AND AMPHETAMINE SULFATE 5; 5; 5; 5 MG/1; MG/1; MG/1; MG/1
20 TABLET ORAL 2 TIMES DAILY
Qty: 60 TABLET | Refills: 0 | Status: SHIPPED | OUTPATIENT
Start: 2019-06-25 | End: 2019-08-30

## 2019-06-25 RX ORDER — ZOLPIDEM TARTRATE 6.25 MG/1
6.25 TABLET, FILM COATED, EXTENDED RELEASE ORAL
Qty: 60 TABLET | Refills: 0 | Status: SHIPPED | OUTPATIENT
Start: 2019-06-25 | End: 2020-02-19

## 2019-06-25 NOTE — TELEPHONE ENCOUNTER
Ambien      Last Written Prescription Date:  05/06/19  Last Fill Quantity: 60,   # refills: 0  Last Office Visit: 12/24/18  Future Office visit:       Routing refill request to provider for review/approval because:  Drug not on the [facility], [facility] or [facility] refill protocol or controlled substance    ADDERALL      Last Written Prescription Date:  05/06/19  Last Fill Quantity: 60,   # refills: 0  Last Office Visit: 12/24/18  Future Office visit:       Routing refill request to provider for review/approval because:  Drug not on the [facility], [facility] or [facility] refill protocol or controlled substance

## 2019-07-03 ENCOUNTER — TELEPHONE (OUTPATIENT)
Dept: OBGYN | Facility: CLINIC | Age: 34
End: 2019-07-03

## 2019-07-03 NOTE — TELEPHONE ENCOUNTER
Pt is past due for f/u pap smear.  [facility] clinic and schedule.    Franca Cardoso  Pap Tracking

## 2019-08-29 DIAGNOSIS — F90.1 ATTENTION-DEFICIT HYPERACTIVITY DISORDER, PREDOMINANTLY HYPERACTIVE TYPE: ICD-10-CM

## 2019-08-29 DIAGNOSIS — Z79.899 CONTROLLED SUBSTANCE AGREEMENT SIGNED: ICD-10-CM

## 2019-08-29 NOTE — TELEPHONE ENCOUNTER
Controlled Substance Refill Request for adderall  Problem List Complete:  No     PROVIDER TO CONSIDER COMPLETION OF PROBLEM LIST AND OVERVIEW/CONTROLLED SUBSTANCE AGREEMENT    Last Written Prescription Date:  6/25/19  Last Fill Quantity: 60,   # refills: 0        Last Office Visit with [facility] primary care provider: 60    Future Office visit:     Controlled substance agreement:   Encounter-Level CSA - 06/06/2016:    Controlled Substance Agreement - Scan on 6/7/2016  8:22 AM: [place] CONTROLLED SUBSTANCE AGREEMENT, 6/6/16 (below)       Patient-Level CSA:    There are no patient-level csa.         Last Urine Drug Screen: No results found for: CDAUT, No results found for: COMDAT, No results found for: THC13, PCP13, COC13, MAMP13, OPI13, AMP13, BZO13, TCA13, MTD13, BAR13, OXY13, PPX13, BUP13     Processing:  Staff will hand deliver Rx to on-site pharmacy     [email or web address]       checked in past 3 months?  No, route to RN

## 2019-08-29 NOTE — TELEPHONE ENCOUNTER
Routing refill request to provider for review/approval because:  Drug not on the [facility] refill protocol        checked - no concerns

## 2019-08-30 RX ORDER — DEXTROAMPHETAMINE SACCHARATE, AMPHETAMINE ASPARTATE, DEXTROAMPHETAMINE SULFATE AND AMPHETAMINE SULFATE 5; 5; 5; 5 MG/1; MG/1; MG/1; MG/1
20 TABLET ORAL 2 TIMES DAILY
Qty: 60 TABLET | Refills: 0 | Status: SHIPPED | OUTPATIENT
Start: 2019-08-30 | End: 2019-10-30

## 2019-10-30 DIAGNOSIS — Z79.899 CONTROLLED SUBSTANCE AGREEMENT SIGNED: ICD-10-CM

## 2019-10-30 DIAGNOSIS — F90.1 ATTENTION-DEFICIT HYPERACTIVITY DISORDER, PREDOMINANTLY HYPERACTIVE TYPE: ICD-10-CM

## 2019-10-30 RX ORDER — DEXTROAMPHETAMINE SACCHARATE, AMPHETAMINE ASPARTATE, DEXTROAMPHETAMINE SULFATE AND AMPHETAMINE SULFATE 5; 5; 5; 5 MG/1; MG/1; MG/1; MG/1
20 TABLET ORAL 2 TIMES DAILY
Qty: 60 TABLET | Refills: 0 | Status: SHIPPED | OUTPATIENT
Start: 2019-10-30 | End: 2019-12-12

## 2019-10-30 NOTE — TELEPHONE ENCOUNTER
Patients daughter had a baby early and they are leaving for [place] tomorrow and need medication for the trip.  Please refill today.    ADDERALL      Last Written Prescription Date:  08/30/19  Last Fill Quantity: 60,   # refills: 0  Last Office Visit: 12/24/18  Future Office visit:       Routing refill request to provider for review/approval because:  Drug not on the [facility] P or [facility] refill protocol or controlled substance

## 2019-10-30 NOTE — TELEPHONE ENCOUNTER
Controlled Substance Refill Request for Adderall 20 mg  Problem List Complete:  No     PROVIDER TO CONSIDER COMPLETION OF PROBLEM LIST AND OVERVIEW/CONTROLLED SUBSTANCE AGREEMENT    Last Written Prescription Date:  8/30/19  Last Fill Quantity: 60,   # refills: 0    Last Office Visit with [facility] [place] care provider: 12/24/18 - Left voice message for patient advising her she needs to schedule appointment for medication check.    Future Office visit:     Controlled substance agreement:   Encounter-Level CSA - 06/06/2016:    Controlled Substance Agreement - Scan on 6/7/2016  8:22 AM: [place] CONTROLLED SUBSTANCE AGREEMENT, 6/6/16     Patient-Level CSA:    There are no patient-level csa.         Last Urine Drug Screen: No results found for: CDAUT, No results found for: COMDAT, No results found for: THC13, PCP13, COC13, MAMP13, OPI13, AMP13, BZO13, TCA13, MTD13, BAR13, OXY13, PPX13, BUP13     Processing:  Rx to be electronically transmitted to pharmacy by provider      [email or web address]    RX [place] program ([place]) reviewed:  reviewed- no concerns

## 2019-12-10 DIAGNOSIS — Z79.899 CONTROLLED SUBSTANCE AGREEMENT SIGNED: ICD-10-CM

## 2019-12-10 DIAGNOSIS — F90.1 ATTENTION-DEFICIT HYPERACTIVITY DISORDER, PREDOMINANTLY HYPERACTIVE TYPE: ICD-10-CM

## 2019-12-10 NOTE — TELEPHONE ENCOUNTER
Pt calling for refill request below:    Controlled Substance Refill Request for Adderall  Problem List Complete:  No     PROVIDER TO CONSIDER COMPLETION OF PROBLEM LIST AND OVERVIEW/CONTROLLED SUBSTANCE AGREEMENT    Last Written Prescription Date:  10/30/19  Last Fill Quantity: 60,   # refills: 0    THE MOST RECENT OFFICE VISIT MUST BE WITHIN THE PAST 3 MONTHS. AT LEAST ONE FACE TO FACE VISIT MUST OCCUR EVERY 6 MONTHS. ADDITIONAL VISITS CAN BE VIRTUAL.  (THIS STATEMENT SHOULD BE DELETED.)    Last [place] Visit [place] [facility] [place]: 12/24/18    Future Office visit:     Controlled substance agreement:   Encounter-Level CSA - 06/06/2016:    Controlled Substance Agreement - Scan on 6/7/2016  8:22 AM: [place] CONTROLLED SUBSTANCE AGREEMENT, 6/6/16     Patient-Level CSA:    There are no patient-level csa.         Last Urine Drug Screen: No results found for: CDAUT, No results found for: COMDAT, No results found for: THC13, PCP13, COC13, MAMP13, OPI13, AMP13, BZO13, TCA13, MTD13, BAR13, OXY13, PPX13, BUP13     Processing:  Rx to be electronically transmitted to pharmacy by provider      [email or web address]       checked in past 3 months?  No, route to RN

## 2019-12-12 RX ORDER — DEXTROAMPHETAMINE SACCHARATE, AMPHETAMINE ASPARTATE, DEXTROAMPHETAMINE SULFATE AND AMPHETAMINE SULFATE 5; 5; 5; 5 MG/1; MG/1; MG/1; MG/1
20 TABLET ORAL 2 TIMES DAILY
Qty: 60 TABLET | Refills: 0 | Status: SHIPPED | OUTPATIENT
Start: 2019-12-12 | End: 2020-01-21

## 2019-12-12 NOTE — TELEPHONE ENCOUNTER
RX [place] ([place] reviewed:  not reviewed/not due - last done on 10/30/19  [place] profile:  [email or web address]

## 2020-01-21 DIAGNOSIS — F90.1 ATTENTION-DEFICIT HYPERACTIVITY DISORDER, PREDOMINANTLY HYPERACTIVE TYPE: ICD-10-CM

## 2020-01-21 DIAGNOSIS — Z79.899 CONTROLLED SUBSTANCE AGREEMENT SIGNED: ICD-10-CM

## 2020-01-21 RX ORDER — DEXTROAMPHETAMINE SACCHARATE, AMPHETAMINE ASPARTATE, DEXTROAMPHETAMINE SULFATE AND AMPHETAMINE SULFATE 5; 5; 5; 5 MG/1; MG/1; MG/1; MG/1
20 TABLET ORAL 2 TIMES DAILY
Qty: 60 TABLET | Refills: 0 | Status: SHIPPED | OUTPATIENT
Start: 2020-01-21 | End: 2020-02-19

## 2020-01-21 NOTE — TELEPHONE ENCOUNTER
ADDERALL      Last Written Prescription Date:  12/12/19  Last Fill Quantity: 60,   # refills: 0  Last Office Visit: 12/24/18  Future Office visit:       Routing refill request to provider for review/approval because:  Drug not on the [facility], [facility] or [facility] refill protocol or controlled substance

## 2020-02-19 DIAGNOSIS — Z79.899 CONTROLLED SUBSTANCE AGREEMENT SIGNED: ICD-10-CM

## 2020-02-19 DIAGNOSIS — F51.01 PRIMARY INSOMNIA: ICD-10-CM

## 2020-02-19 DIAGNOSIS — F90.1 ATTENTION-DEFICIT HYPERACTIVITY DISORDER, PREDOMINANTLY HYPERACTIVE TYPE: ICD-10-CM

## 2020-02-19 RX ORDER — DEXTROAMPHETAMINE SACCHARATE, AMPHETAMINE ASPARTATE, DEXTROAMPHETAMINE SULFATE AND AMPHETAMINE SULFATE 5; 5; 5; 5 MG/1; MG/1; MG/1; MG/1
20 TABLET ORAL 2 TIMES DAILY
Qty: 60 TABLET | Refills: 0 | Status: SHIPPED | OUTPATIENT
Start: 2020-02-19 | End: 2020-04-17

## 2020-02-19 RX ORDER — ZOLPIDEM TARTRATE 6.25 MG/1
6.25 TABLET, FILM COATED, EXTENDED RELEASE ORAL
Qty: 60 TABLET | Refills: 0 | Status: SHIPPED | OUTPATIENT
Start: 2020-02-19 | End: 2020-06-25

## 2020-02-19 NOTE — TELEPHONE ENCOUNTER
Adderall and zolpidem      Last Written Prescription Date:  1/21/20 and 6/25/19  Last Fill Quantity: 60,   # refills: 0  Last Office Visit: 12/24/18  Future Office visit:       Routing refill request to provider for review/approval because:  Drug not on the [facility], [facility] or [facility] refill protocol or controlled substance

## 2020-03-03 NOTE — NURSING NOTE
"Chief Complaint   Patient presents with     Vaginal Bleeding     Bleeding after 18 Depo injection. Continued bleeding since then including bloating.        Initial /70  Pulse 88  Ht 5' 4\" (1.626 m)  Wt 143 lb (64.9 kg)  Breastfeeding? No  BMI 24.55 kg/m2 Estimated body mass index is 24.55 kg/(m^2) as calculated from the following:    Height as of this encounter: 5' 4\" (1.626 m).    Weight as of this encounter: 143 lb (64.9 kg).  BP completed using cuff size: regular    Questioned patient about current smoking habits.  Pt. currently smokes.  Advised about smoking cessation.          Trixie Thomas LPN                " [General Appearance - In No Acute Distress] : no acute distress [] : no respiratory distress [Oriented To Time, Place, And Person] : oriented to person, place, and time [Normal Station and Gait] : the gait and station were normal for the patient's age

## 2020-04-14 DIAGNOSIS — F51.01 PRIMARY INSOMNIA: ICD-10-CM

## 2020-04-14 DIAGNOSIS — F90.1 ATTENTION-DEFICIT HYPERACTIVITY DISORDER, PREDOMINANTLY HYPERACTIVE TYPE: ICD-10-CM

## 2020-04-14 DIAGNOSIS — F41.9 ANXIETY: ICD-10-CM

## 2020-04-14 DIAGNOSIS — Z79.899 CONTROLLED SUBSTANCE AGREEMENT SIGNED: ICD-10-CM

## 2020-04-14 RX ORDER — DEXTROAMPHETAMINE SACCHARATE, AMPHETAMINE ASPARTATE, DEXTROAMPHETAMINE SULFATE AND AMPHETAMINE SULFATE 5; 5; 5; 5 MG/1; MG/1; MG/1; MG/1
20 TABLET ORAL 2 TIMES DAILY
Qty: 60 TABLET | Refills: 0 | Status: CANCELLED | OUTPATIENT
Start: 2020-04-14

## 2020-04-14 RX ORDER — FLUOXETINE 10 MG/1
CAPSULE ORAL
Qty: 90 CAPSULE | Refills: 1 | Status: CANCELLED | OUTPATIENT
Start: 2020-04-14

## 2020-04-14 NOTE — TELEPHONE ENCOUNTER
"Requested Prescriptions   Pending Prescriptions Disp Refills     FLUoxetine (PROZAC) 10 MG capsule  Last Written Prescription Date:  05/18/18  Last Fill Quantity: 90,  # refills: 1   Last office visit: No previous visit found with prescribing provider:  12/24/18   Future Office Visit:     90 capsule 1     Sig: TAKE 1 CAPSULE (10 MG) BY MOUTH DAILY       SSRIs Protocol Failed - 4/14/2020  2:54 PM        Failed - Recent (12 mo) or future (30 days) visit within the authorizing provider's specialty     Patient has had an office visit with the authorizing provider or a provider within the authorizing providers department within the previous 12 mos or has a future within next 30 days. See \"Patient Info\" tab in inbasket, or \"Choose Columns\" in Meds & Orders section of the refill encounter.              Passed - Medication is active on med list        Passed - Patient is age 18 or older        Passed - No active pregnancy on record        Passed - No positive pregnancy test in last 12 months           Adderall      Last Written Prescription Date:  02/19/20  Last Fill Quantity: 60,   # refills: 0  Last Office Visit: 12/24/18  Future Office visit:       Routing refill request to provider for review/approval because:  Drug not on the [facility], [facility] or [facility] refill protocol or controlled substance    Ambien      Last Written Prescription Date:  02/19/20  Last Fill Quantity: 60,   # refills: 0  Last Office Visit: 12/24/18  Future Office visit:       Routing refill request to provider for review/approval because:  Drug not on the [facility], [facility] or [facility] refill protocol or controlled substance        "

## 2020-04-17 ENCOUNTER — VIRTUAL VISIT (OUTPATIENT)
Dept: INTERNAL MEDICINE | Facility: CLINIC | Age: 35
End: 2020-04-17
Payer: COMMERCIAL

## 2020-04-17 DIAGNOSIS — F41.9 ANXIETY: ICD-10-CM

## 2020-04-17 DIAGNOSIS — F90.1 ATTENTION-DEFICIT HYPERACTIVITY DISORDER, PREDOMINANTLY HYPERACTIVE TYPE: ICD-10-CM

## 2020-04-17 DIAGNOSIS — F33.0 MAJOR DEPRESSIVE DISORDER, RECURRENT EPISODE, MILD (H): Primary | ICD-10-CM

## 2020-04-17 DIAGNOSIS — Z79.899 CONTROLLED SUBSTANCE AGREEMENT SIGNED: ICD-10-CM

## 2020-04-17 PROCEDURE — 96127 BRIEF EMOTIONAL/BEHAV ASSMT: CPT | Mod: TEL | Performed by: INTERNAL MEDICINE

## 2020-04-17 PROCEDURE — 99214 OFFICE O/P EST MOD 30 MIN: CPT | Mod: TEL | Performed by: INTERNAL MEDICINE

## 2020-04-17 RX ORDER — BUPROPION HYDROCHLORIDE 150 MG/1
TABLET ORAL
Qty: 90 TABLET | Refills: 1 | Status: SHIPPED | OUTPATIENT
Start: 2020-04-17 | End: 2020-09-08

## 2020-04-17 RX ORDER — DEXTROAMPHETAMINE SACCHARATE, AMPHETAMINE ASPARTATE, DEXTROAMPHETAMINE SULFATE AND AMPHETAMINE SULFATE 5; 5; 5; 5 MG/1; MG/1; MG/1; MG/1
20 TABLET ORAL 2 TIMES DAILY
Qty: 60 TABLET | Refills: 0 | Status: SHIPPED | OUTPATIENT
Start: 2020-04-17 | End: 2020-05-22

## 2020-04-17 RX ORDER — FLUOXETINE 10 MG/1
CAPSULE ORAL
Qty: 90 CAPSULE | Refills: 1 | Status: CANCELLED | OUTPATIENT
Start: 2020-04-17

## 2020-04-17 ASSESSMENT — ANXIETY QUESTIONNAIRES
IF YOU CHECKED OFF ANY PROBLEMS ON THIS QUESTIONNAIRE, HOW DIFFICULT HAVE THESE PROBLEMS MADE IT FOR YOU TO DO YOUR WORK, TAKE CARE OF THINGS AT HOME, OR GET ALONG WITH OTHER PEOPLE: VERY DIFFICULT
7. FEELING AFRAID AS IF SOMETHING AWFUL MIGHT HAPPEN: NEARLY EVERY DAY
5. BEING SO RESTLESS THAT IT IS HARD TO SIT STILL: SEVERAL DAYS
1. FEELING NERVOUS, ANXIOUS, OR ON EDGE: NEARLY EVERY DAY
2. NOT BEING ABLE TO STOP OR CONTROL WORRYING: MORE THAN HALF THE DAYS
6. BECOMING EASILY ANNOYED OR IRRITABLE: NEARLY EVERY DAY
3. WORRYING TOO MUCH ABOUT DIFFERENT THINGS: NEARLY EVERY DAY
GAD7 TOTAL SCORE: 16

## 2020-04-17 ASSESSMENT — PATIENT HEALTH QUESTIONNAIRE - PHQ9
5. POOR APPETITE OR OVEREATING: SEVERAL DAYS
SUM OF ALL RESPONSES TO PHQ QUESTIONS 1-9: 13

## 2020-04-17 NOTE — PROGRESS NOTES
"Melissa Benavidez is a 34 year old female who is being evaluated via a billable telephone visit.      The patient has been notified of following:     \"This telephone visit will be conducted via a call between you and your physician/provider. We have found that certain health care needs can be provided without the need for a physical exam.  This service lets us provide the care you need with a short phone conversation.  If a prescription is necessary we can send it directly to your pharmacy.  If lab work is needed we can place an order for that and you can then stop by our lab to have the test done at a later time.    Telephone visits are billed at different rates depending on your insurance coverage. During this emergency period, for some insurers they may be billed the same as an in-person visit.  Please reach out to your insurance provider with any questions.    If during the course of the call the physician/provider feels a telephone visit is not appropriate, you will not be charged for this service.\"    Patient has given verbal consent for Telephone visit?  Yes    How would you like to obtain your AVS? Mail a copy     Jenna Velasco CMA.      Subjective     Phone # [phone] (not a smart phone)    Melissa Benavidez is a 34 year old female who presents to clinic today for the following health issues:    HPI   Depression and Anxiety Follow-Up    How are you doing with your depression since your last visit? Worsened     How are you doing with your anxiety since your last visit?  worsened    Are you having other symptoms that might be associated with depression or anxiety? Crying and racing of her mind    Have you had a significant life event? COVID    Do you have any concerns with your use of alcohol or other drugs? no  No thoughts of harming self. Would not harm self    She sleeps 3 hours per night. She has been waking up every hour.   She does not drink caffeine.  Not much alcohol.   Social History "     Tobacco Use     Smoking status: Current Every Day Smoker     Packs/day: 0.50     Years: 3.00     Pack years: 1.50     Last attempt to quit: 2012     Years since quittin.3     Smokeless tobacco: Never Used     Tobacco comment: 5 -10 cigarrets daily   Substance Use Topics     Alcohol use: Yes     Alcohol/week: 0.0 standard drinks     Comment: 1-2 drinks/week     Drug use: No     PHQ 2018   PHQ-9 Total Score 6 5 13   Q9: Thoughts of better off dead/self-harm past 2 weeks Not at all Not at all Several days     RASHARD-7 SCORE 2016   Total Score - - -   Total Score 4 7 16       ADHD. The patient reports that she is having difficulty problems with focusing.  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  She takes breaks from adderall on the weekends usually.    Tobacco use.  1/2 pack per day.     Suicide Assessment Five-step Evaluation and Treatment (SAFE-T)        1 hour per day of recess with son        Patient Active Problem List   Diagnosis     Hyperlipidemia LDL goal <160     Migraine headache     ADHD (attention deficit hyperactivity disorder)     Anxiety     Mild major depression (H)     Major depressive disorder, recurrent episode, mild (H)     Controlled substance agreement signed- checked 19-no concerns     Gastroesophageal reflux disease without esophagitis     Cervical high risk HPV (human papillomavirus) test positive     Past Surgical History:   Procedure Laterality Date     GYN SURGERY  3/5/11           Social History     Tobacco Use     Smoking status: Current Every Day Smoker     Packs/day: 0.50     Years: 3.00     Pack years: 1.50     Last attempt to quit: 2012     Years since quittin.3     Smokeless tobacco: Never Used     Tobacco comment: 5 -10 cigarrets daily   Substance Use Topics     Alcohol use: Yes     Alcohol/week: 0.0 standard drinks     Comment: 1-2 drinks/week     Family History   Problem Relation Age of  Onset     Family History Negative Mother      Substance Abuse Father          44yo      Heart Disease Maternal Grandmother         heart attack     Family History Negative Son         born  [place]     Family History Negative Sister         1/2 bro     Family History Negative Brother         1/2 bro     Diabetes No family hx of      Cerebrovascular Disease No family hx of      Cancer No family hx of      Neurologic Disorder No family hx of            PROBLEMS TO ADD ON...  Reviewed and updated as needed this visit by Provider         Review of Systems   ROS COMP: CONSTITUTIONAL: NEGATIVE for fever, chills, change in weight  RESP: NEGATIVE for significant cough or SOB  CV: NEGATIVE for chest pain, palpitations or peripheral edema  Psych: POS depression and anxiey      Objective    There were no vitals taken for this visit.  There is no height or weight on file to calculate BMI.  Physical Exam   Weight of 145 lb  GENERAL: healthy, alert and no distress  Psych: normal affect    Diagnostic Test Results:  Labs reviewed in Epic        Assessment & Plan     (F33.0) Major depressive disorder, recurrent episode, mild (H)  (primary encounter diagnosis)  Comment: would not harm self  Plan: prozac; wellbutrin  -recommend counselor- Jessica    (F41.9) Anxiety  Comment:  Plan: buPROPion (WELLBUTRIN XL) 150 MG 24 hr tablet            (F90.1) Attention-deficit hyperactivity disorder, predominantly hyperactive type  Comment:    Plan: amphetamine-dextroamphetamine (ADDERALL) 20 MG         tablet            (Z79.899) Controlled substance agreement signed- ok 3/12/19  Comment:  Plan: amphetamine-dextroamphetamine (ADDERALL) 20 MG         tablet          Insomnia.  Continue on ambien. Counselor- Jessica     Tobacco Cessation:   reports that she has been smoking. She has a 1.50 pack-year smoking history. She has never used smokeless tobacco.          See Patient Instructions    Follow up 6 months    Franca Mandujano,  MD  [facility]      Pap smear due- patient aware      Phone call duration: 14 minutes    Franca Mandujano MD

## 2020-04-18 ASSESSMENT — ANXIETY QUESTIONNAIRES: GAD7 TOTAL SCORE: 16

## 2020-04-23 RX ORDER — ZOLPIDEM TARTRATE 6.25 MG/1
6.25 TABLET, FILM COATED, EXTENDED RELEASE ORAL
Qty: 60 TABLET | Refills: 0 | Status: CANCELLED | OUTPATIENT
Start: 2020-04-23

## 2020-04-23 NOTE — TELEPHONE ENCOUNTER
Called patient to schedule a video or phone visit. Patient stated she spoke with Dr. Mandujano already.

## 2020-05-21 DIAGNOSIS — F90.1 ATTENTION-DEFICIT HYPERACTIVITY DISORDER, PREDOMINANTLY HYPERACTIVE TYPE: ICD-10-CM

## 2020-05-21 DIAGNOSIS — Z79.899 CONTROLLED SUBSTANCE AGREEMENT SIGNED: ICD-10-CM

## 2020-05-21 NOTE — TELEPHONE ENCOUNTER
Controlled Substance Refill Request for adderall 20mg  Problem List Complete:  No     PROVIDER TO CONSIDER COMPLETION OF PROBLEM LIST AND OVERVIEW/CONTROLLED SUBSTANCE AGREEMENT    Last Written Prescription Date:  4/17/20  Last Fill Quantity: 60,   # refills: 0    THE MOST RECENT OFFICE VISIT MUST BE WITHIN THE PAST 3 MONTHS. AT LEAST ONE FACE TO FACE VISIT MUST OCCUR EVERY 6 MONTHS. ADDITIONAL VISITS CAN BE VIRTUAL.  (THIS STATEMENT SHOULD BE DELETED.)    Last Office Visit with [facility] primary care provider: 4/17/20 virtual with Delbert    Future Office visit:     Controlled substance agreement:   Encounter-Level CSA - 06/06/2016:    Controlled Substance Agreement - Scan on 6/7/2016  8:22 AM: [place] CONTROLLED SUBSTANCE AGREEMENT, 6/6/16     Patient-Level CSA:    There are no patient-level csa.         Last Urine Drug Screen: No results found for: CDAUT, No results found for: COMDAT, No results found for: THC13, PCP13, COC13, MAMP13, OPI13, AMP13, BZO13, TCA13, MTD13, BAR13, OXY13, PPX13, BUP13     Processing:  e scribe     [email or web address]       checked in past 3 months?  No, route to RN

## 2020-05-22 RX ORDER — DEXTROAMPHETAMINE SACCHARATE, AMPHETAMINE ASPARTATE, DEXTROAMPHETAMINE SULFATE AND AMPHETAMINE SULFATE 5; 5; 5; 5 MG/1; MG/1; MG/1; MG/1
20 TABLET ORAL 2 TIMES DAILY
Qty: 60 TABLET | Refills: 0 | Status: SHIPPED | OUTPATIENT
Start: 2020-05-22 | End: 2020-07-11

## 2020-05-22 NOTE — TELEPHONE ENCOUNTER
[place] reviewed:  reviewed- no concerns  [place] profile:  [email or web address]

## 2020-06-11 ENCOUNTER — VIRTUAL VISIT (OUTPATIENT)
Dept: INTERNAL MEDICINE | Facility: CLINIC | Age: 35
End: 2020-06-11
Payer: COMMERCIAL

## 2020-06-11 DIAGNOSIS — K21.9 GASTROESOPHAGEAL REFLUX DISEASE WITHOUT ESOPHAGITIS: Primary | ICD-10-CM

## 2020-06-11 PROCEDURE — 99213 OFFICE O/P EST LOW 20 MIN: CPT | Mod: GT | Performed by: INTERNAL MEDICINE

## 2020-06-11 NOTE — PATIENT INSTRUCTIONS
You may want to consider taking Pepcid (famotidine) 20 mg daily.  You can take omeprazole instead if the acid reflux symptoms flare.

## 2020-06-11 NOTE — LETTER
[place] [address]  [facility] [address]  [phone]    6/11/2020     Melissa Benavidez   1985     To Whom it May Concern:    Ms. Benavidez has recurrent acid reflux that can flare and cause episodes with vomiting. She does not have any other symptoms of COVID 19.  She is medically able to work.         Sincerely,             Fiona Vazquez MD

## 2020-06-11 NOTE — PROGRESS NOTES
"Melissa Benavidez is a 34 year old female who is being evaluated via a billable video visit.      The patient has been notified of following:     \"This video visit will be conducted via a call between you and your physician/provider. We have found that certain health care needs can be provided without the need for an in-person physical exam.  This service lets us provide the care you need with a video conversation.  If a prescription is necessary we can send it directly to your pharmacy.  If lab work is needed we can place an order for that and you can then stop by our lab to have the test done at a later time.    Video visits are billed at different rates depending on your insurance coverage.  Please reach out to your insurance provider with any questions.    If during the course of the call the physician/provider feels a video visit is not appropriate, you will not be charged for this service.\"    Patient has given verbal consent for Video visit? Yes    How would you like to obtain your AVS? Mail a copy  Patient would like the video invitation sent by: Text to cell phone: [phone]    Will anyone else be joining your video visit? No    Subjective     Melissa Benavidez is a 34 year old female who presents today via video visit for the following health issues:    [facility]    Video Start Time: 1:30    She presents today with request for a work note to allow her to return to work.  She has had problems with chronic acid reflux and reports from time to time she will have a significant flare and gets a large volume of liquid up into her esophagus which then triggers some dry heaving and eventual vomiting.  This will then be associated with some chest pain and some mild headache.  She will usually take some acid blocker to settle down symptoms, this episode is been a little bit more prolonged than usual lasting for days now.  Because of the vomiting, her employer is concerned that she potentially could have COVID-19 " and is requesting that she have a letter stating it is medically able to return to work.    She reports no other symptoms including no fever, chills, diarrhea, cough, shortness of breath.    She had been taking Zantac occasionally but was concerned about regarding cancer.  She is also taking omeprazole intermittently but had also her that it could have some issues long-term.    Patient Active Problem List   Diagnosis     Hyperlipidemia LDL goal <160     Migraine headache     ADHD (attention deficit hyperactivity disorder)     Anxiety     Mild major depression (H)     Major depressive disorder, recurrent episode, mild (H)     Controlled substance agreement signed- checked 8/29/19-no concerns     Gastroesophageal reflux disease without esophagitis     Cervical high risk HPV (human papillomavirus) test positive     Current Outpatient Medications   Medication Sig Dispense Refill     amphetamine-dextroamphetamine (ADDERALL) 20 MG tablet Take 1 tablet (20 mg) by mouth 2 times daily Need to schedule future office appt before additional refills. 60 tablet 0     buPROPion (WELLBUTRIN XL) 150 MG 24 hr tablet TAKE 1 TABLET (150 MG) BY MOUTH EVERY MORNING 90 tablet 1     FLUoxetine (PROZAC) 20 MG capsule Take 1 capsule (20 mg) by mouth daily 90 capsule 1     ibuprofen (ADVIL,MOTRIN) 200 MG tablet Take 3 tablets by mouth every 8 hours as needed for pain. 30 tablet 0     medroxyPROGESTERone (DEPO-PROVERA) 150 MG/ML IM injection Inject 1 mL (150 mg) into the muscle every 3 months 1 mL 0     zolpidem ER 6.25 MG PO CR tablet Take 1 tablet (6.25 mg) by mouth nightly as needed for sleep 60 tablet 0     FLUoxetine (PROZAC) 10 MG capsule TAKE 1 CAPSULE (10 MG) BY MOUTH DAILY (Patient not taking: Reported on 6/11/2020) 90 capsule 1     hydrocortisone (CORTAID) 1 % cream Apply sparingly to affected area three times daily for 14 days. (Patient not taking: Reported on 6/11/2020) 30 g 0      Social History     Tobacco Use     Smoking  "status: Current Every Day Smoker     Packs/day: 0.50     Years: 3.00     Pack years: 1.50     Last attempt to quit: 2012     Years since quittin.4     Smokeless tobacco: Never Used     Tobacco comment: 5 -10 cigarrets daily   Substance Use Topics     Alcohol use: Yes     Alcohol/week: 0.0 standard drinks     Comment: 1-2 drinks/week     Drug use: No        Reviewed and updated as needed this visit by Provider         Review of Systems   No fever, chills, shortness of breath, cough      Objective    There were no vitals taken for this visit.  Estimated body mass index is 24.03 kg/m  as calculated from the following:    Height as of 18: 1.626 m (5' 4\").    Weight as of 18: 63.5 kg (140 lb).  Physical Exam     GENERAL: Healthy, alert and no distress  EYES: Eyes grossly normal to inspection.  No discharge or erythema, or obvious scleral/conjunctival abnormalities.  RESP: No audible wheeze, cough, or visible cyanosis.  No visible retractions or increased work of breathing.    SKIN: Visible skin clear. No significant rash, abnormal pigmentation or lesions.  NEURO: Cranial nerves grossly intact.  Mentation and speech appropriate for age.  PSYCH: Mentation appears normal, affect normal/bright, judgement and insight intact, normal speech and appearance well-groomed.              Assessment & Plan     1. Gastroesophageal reflux disease without esophagitis  A note indicating she is medically able to work was printed and she will pick this up tomorrow.  Advised to use over-the-counter famotidine 20 mg daily as it does not have the same issues as the Zantac did.  Advised she could then use omeprazole as needed for flares but she may want to consider using the Pepcid more chronically.         Video-Visit Details    Type of service:  Video Visit    Video End Time:1:37    Originating Location (pt. Location): Home    Distant Location (provider location):  [facility]     Platform used for Video " Visit: [employer]    Return in about 4 months (around 10/17/2020) for med follow up with primary.       Fiona Vazquez MD

## 2020-06-25 DIAGNOSIS — F51.01 PRIMARY INSOMNIA: ICD-10-CM

## 2020-06-25 RX ORDER — ZOLPIDEM TARTRATE 6.25 MG/1
6.25 TABLET, FILM COATED, EXTENDED RELEASE ORAL
Qty: 60 TABLET | Refills: 0 | Status: SHIPPED | OUTPATIENT
Start: 2020-06-25 | End: 2020-08-09

## 2020-06-25 NOTE — TELEPHONE ENCOUNTER
Pt calling for below refill requests:    Ambien      Last Written Prescription Date:  2/19/20  Last Fill Quantity: 60,   # refills: 0  Last Office Visit: 6/11/20  Future Office visit:       Routing refill request to provider for review/approval because:  Drug not on the [facility], [facility] or  Health refill protocol or controlled substance    Controlled Substance Refill Request for Adderall  Problem List Complete:  No     PROVIDER TO CONSIDER COMPLETION OF PROBLEM LIST AND OVERVIEW/CONTROLLED SUBSTANCE AGREEMENT    Last Written Prescription Date:  5/22/20  Last Fill Quantity: 60,   # refills: 0    THE MOST RECENT OFFICE VISIT MUST BE WITHIN THE PAST 3 MONTHS. AT LEAST ONE FACE TO FACE VISIT MUST OCCUR EVERY 6 MONTHS. ADDITIONAL VISITS CAN BE VIRTUAL.  (THIS STATEMENT SHOULD BE DELETED.)    Last Office Visit with [facility] primary care provider: 6/11/20    Future Office visit:     Controlled substance agreement:   Encounter-Level CSA - 06/06/2016:    Controlled Substance Agreement - Scan on 6/7/2016  8:22 AM: [place] CONTROLLED SUBSTANCE AGREEMENT, 6/6/16     Patient-Level CSA:    There are no patient-level csa.         Last Urine Drug Screen: No results found for: CDAUT, No results found for: COMDAT, No results found for: THC13, PCP13, COC13, MAMP13, OPI13, AMP13, BZO13, TCA13, MTD13, BAR13, OXY13, PPX13, BUP13     Processing:  Rx to be electronically transmitted to pharmacy by provider      [email or web address]       checked in past 3 months?  No, route to RN

## 2020-06-26 NOTE — TELEPHONE ENCOUNTER
Signed Prescriptions:                        Disp   Refills    zolpidem ER (AMBIEN CR) 6.25 MG CR tablet  60 tab*0        Sig: Take 1 tablet (6.25 mg) by mouth nightly as needed           for sleep  Authorizing Provider: LIBAN CERVANTES    Routing refill request to provider for review/approval because:  Drug not on the [facility] refill protocol

## 2020-07-07 DIAGNOSIS — Z79.899 CONTROLLED SUBSTANCE AGREEMENT SIGNED: ICD-10-CM

## 2020-07-07 DIAGNOSIS — F90.1 ATTENTION-DEFICIT HYPERACTIVITY DISORDER, PREDOMINANTLY HYPERACTIVE TYPE: ICD-10-CM

## 2020-07-08 ENCOUNTER — TELEPHONE (OUTPATIENT)
Dept: INTERNAL MEDICINE | Facility: CLINIC | Age: 35
End: 2020-07-08

## 2020-07-11 RX ORDER — DEXTROAMPHETAMINE SACCHARATE, AMPHETAMINE ASPARTATE, DEXTROAMPHETAMINE SULFATE AND AMPHETAMINE SULFATE 5; 5; 5; 5 MG/1; MG/1; MG/1; MG/1
TABLET ORAL
Qty: 60 TABLET | Refills: 0 | Status: SHIPPED | OUTPATIENT
Start: 2020-07-11 | End: 2020-08-06

## 2020-07-23 ENCOUNTER — VIRTUAL VISIT (OUTPATIENT)
Dept: INTERNAL MEDICINE | Facility: CLINIC | Age: 35
End: 2020-07-23
Payer: COMMERCIAL

## 2020-07-23 DIAGNOSIS — K21.9 GASTROESOPHAGEAL REFLUX DISEASE WITHOUT ESOPHAGITIS: Primary | ICD-10-CM

## 2020-07-23 PROCEDURE — 99213 OFFICE O/P EST LOW 20 MIN: CPT | Mod: GT | Performed by: INTERNAL MEDICINE

## 2020-07-23 NOTE — LETTER
[place] [address]  [facility] [address]  [phone]    7/23/2020     Melissa Benavidez   1985      To Whom it May Concern:    Ms. Benavidez has recurrent acid reflux that can flare and cause episodes with vomiting. She does not have any other symptoms of COVID 19.  She is medically able to work.            Sincerely,                  Fiona Vazquez MD   Electronically signed

## 2020-07-23 NOTE — PROGRESS NOTES
"Melissa Benavidez is a 34 year old female who is being evaluated via a billable video visit.      The patient has been notified of following:     \"This video visit will be conducted via a call between you and your physician/provider. We have found that certain health care needs can be provided without the need for an in-person physical exam.  This service lets us provide the care you need with a video conversation.  If a prescription is necessary we can send it directly to your pharmacy.  If lab work is needed we can place an order for that and you can then stop by our lab to have the test done at a later time.    Video visits are billed at different rates depending on your insurance coverage.  Please reach out to your insurance provider with any questions.    If during the course of the call the physician/provider feels a video visit is not appropriate, you will not be charged for this service.\"    Patient has given verbal consent for Video visit? Yes  How would you like to obtain your AVS? Mail a copy  If you are dropped from the video visit, the video invite should be resent to: Text to cell phone: [phone]  Will anyone else be joining your video visit? No    Subjective     Melissa Benavidez is a 34 year old female who presents today via video visit for the following health issues:    HPI    Video Start Time: 1:08    She presents with continued acid reflux, had to miss work and needs a note to return to work.  Please see my visit with her from 6/11/2020 for the same condition.  After her visit with me, she did try using Pepcid but found that that did not help as well as Zantac or omeprazole, diminish the symptoms only slightly.  She then started to have more significant heartburn and missed the 2 days of work because of the vomiting.  She gets a little headache with that and so her work says she has to have a note before she can come back because they are concerned about COVID 19.        Patient Active " Problem List   Diagnosis     Hyperlipidemia LDL goal <160     Migraine headache     ADHD (attention deficit hyperactivity disorder)     Anxiety     Mild major depression (H)     Major depressive disorder, recurrent episode, mild (H)     Controlled substance agreement signed- checked 19-no concerns     Gastroesophageal reflux disease without esophagitis     Cervical high risk HPV (human papillomavirus) test positive     Current Outpatient Medications   Medication Sig Dispense Refill     amphetamine-dextroamphetamine (ADDERALL) 20 MG tablet TAKE ONE TABLET BY MOUTH TWICE A DAY 60 tablet 0     buPROPion (WELLBUTRIN XL) 150 MG 24 hr tablet TAKE 1 TABLET (150 MG) BY MOUTH EVERY MORNING 90 tablet 1     FLUoxetine (PROZAC) 20 MG capsule Take 1 capsule (20 mg) by mouth daily 90 capsule 1     ibuprofen (ADVIL,MOTRIN) 200 MG tablet Take 3 tablets by mouth every 8 hours as needed for pain. 30 tablet 0     zolpidem ER (AMBIEN CR) 6.25 MG CR tablet Take 1 tablet (6.25 mg) by mouth nightly as needed for sleep 60 tablet 0     medroxyPROGESTERone (DEPO-PROVERA) 150 MG/ML IM injection Inject 1 mL (150 mg) into the muscle every 3 months (Patient not taking: Reported on 2020) 1 mL 0        Social History     Tobacco Use     Smoking status: Current Every Day Smoker     Packs/day: 0.50     Years: 3.00     Pack years: 1.50     Last attempt to quit: 2012     Years since quittin.5     Smokeless tobacco: Never Used     Tobacco comment: 5 -10 cigarrets daily   Substance Use Topics     Alcohol use: Yes     Alcohol/week: 0.0 standard drinks     Comment: 1-2 drinks/week     Drug use: No          Reviewed and updated as needed this visit by Provider         Review of Systems   No fever, chills, cough, shortness of breath, the only chest pain she experiences is related to the heartburn it is not diffuse      Objective             Physical Exam     GENERAL: Healthy, alert and no distress  EYES: Eyes grossly normal to  inspection.  No discharge or erythema, or obvious scleral/conjunctival abnormalities.  RESP: No audible wheeze, cough, or visible cyanosis.  No visible retractions or increased work of breathing.    SKIN: Visible skin clear. No significant rash, abnormal pigmentation or lesions.  NEURO: Cranial nerves grossly intact.  Mentation and speech appropriate for age.  PSYCH: Mentation appears normal, affect normal/bright, judgement and insight intact, normal speech and appearance well-groomed.              Assessment & Plan     1. Gastroesophageal reflux disease without esophagitis  I did a letter for her to be able to return to work stating that her symptoms are due to COVID-19.  The Pepcid did not seem to work very well and the Zantac supply still a problem so suggest we go ahead and have her take omeprazole and strongly encouraged her to take it daily.  She is agreeable to doing this.  If she continues to still have issues with a lot of vomiting and missing work, the neck step may be to refer her to GI she may be a candidate for a Nissen procedure  - omeprazole (PRILOSEC) 20 MG DR capsule; Take 1 capsule (20 mg) by mouth daily  Dispense: 90 capsule; Refill: 3           Video-Visit Details    Type of service:  Video Visit    Video End Time:1:15    Originating Location (pt. Location): Home    Distant Location (provider location): [place]    Platform used for Video Visit: [employer]    No follow-ups on file.       Fiona Vazquez MD

## 2020-07-27 ENCOUNTER — ALLIED HEALTH/NURSE VISIT (OUTPATIENT)
Dept: NURSING | Facility: CLINIC | Age: 35
End: 2020-07-27
Payer: COMMERCIAL

## 2020-07-27 DIAGNOSIS — Z30.42 DEPO-PROVERA CONTRACEPTIVE STATUS: Primary | ICD-10-CM

## 2020-07-27 LAB — HCG UR QL: NEGATIVE

## 2020-07-27 PROCEDURE — 96372 THER/PROPH/DIAG INJ SC/IM: CPT

## 2020-07-27 PROCEDURE — 81025 URINE PREGNANCY TEST: CPT | Performed by: INTERNAL MEDICINE

## 2020-07-27 RX ORDER — MEDROXYPROGESTERONE ACETATE 150 MG/ML
150 INJECTION, SUSPENSION INTRAMUSCULAR ONCE
Status: COMPLETED | OUTPATIENT
Start: 2020-07-27 | End: 2020-07-27

## 2020-07-27 RX ADMIN — MEDROXYPROGESTERONE ACETATE 150 MG: 150 INJECTION, SUSPENSION INTRAMUSCULAR at 17:33

## 2020-07-29 ENCOUNTER — TELEPHONE (OUTPATIENT)
Dept: INTERNAL MEDICINE | Facility: CLINIC | Age: 35
End: 2020-07-29

## 2020-07-29 NOTE — TELEPHONE ENCOUNTER
Patient Complains of 8 day history of illness, started with nausea and vomiting.  Vomits 1-2 times per day. States she vomits weekly because of her acid reflux so vomiting is not new to her but this feels different.     Low grade fever, productive cough of yellow-orange phlegm. States she was exposed to someone with Covid 19.  She is drinking fluids, urinating well.  She is not allowed to work until she has been tested for Covid and gets a negative test.  Advised all suspected Covid 19 cases go through [email or web address] to have virtual visit with a provider, have testing ordered and told which site to go to for testing.      Regarding swollen joints and wanting to discuss testing for autoimmune diseases, advised patient that we do not see patient's in clinic that are having symptoms suspicious for Covid 19 but that after her symptoms have resolved (7 days after symptoms started or 72 hours without fever) she could be seen in clinic.   ADELINA Berman R.N.

## 2020-07-29 NOTE — TELEPHONE ENCOUNTER
Patient calling  She would like to be seen in clinic or get lab orders for autoimmune diseases    Patient wants to be tested for COVID    Patient has swelling in her knuckles and ankles. Glands in neck have swelling. Cough, fever    Please advise  Ok to call and  [phone]

## 2020-07-30 NOTE — TELEPHONE ENCOUNTER
Patient calls back and is having a COVID-19 test tomorrow thru her work at [employer].  Patient will wait until tests results come back and if negative and she still has joint pain she will call back to make a VV appointment.

## 2020-07-30 NOTE — TELEPHONE ENCOUNTER
Left message on home voicemail asking patient to return call to clinic.  See questions from MD below that need to be answered.    Also ask why she has not yet done an [facility] visit for suspected Covid 19.  States she has been ill for 8 days, was exposed to someone with Covid 19.  Of note, patient came into clinic Mon. 7/27 for a nurse only visit and would have been ill at that time. ADELINA Berman R.N.     Pt has been accepted at OP center and facility will have a bed pending insurance approval. 
LESLEE requested Geovani at [facility] [place] to pursue authorization today. Insurance auth pending. LESLEE 
will continue to follow.

## 2020-07-30 NOTE — TELEPHONE ENCOUNTER
We could try a virtual visit if she has not seen anyone about swelling in her joints.   When did the joint swelling start?

## 2020-08-06 DIAGNOSIS — F90.1 ATTENTION-DEFICIT HYPERACTIVITY DISORDER, PREDOMINANTLY HYPERACTIVE TYPE: ICD-10-CM

## 2020-08-06 DIAGNOSIS — F51.01 PRIMARY INSOMNIA: ICD-10-CM

## 2020-08-06 DIAGNOSIS — Z79.899 CONTROLLED SUBSTANCE AGREEMENT SIGNED: ICD-10-CM

## 2020-08-06 NOTE — TELEPHONE ENCOUNTER
Pt calling for below refill requests:    Controlled Substance Refill Request for Adderall  Problem List Complete:  No     PROVIDER TO CONSIDER COMPLETION OF PROBLEM LIST AND OVERVIEW/CONTROLLED SUBSTANCE AGREEMENT    Last Written Prescription Date:  7/11/20  Last Fill Quantity: 60,   # refills: 0    THE MOST RECENT OFFICE VISIT MUST BE WITHIN THE PAST 3 MONTHS. AT LEAST ONE FACE TO FACE VISIT MUST OCCUR EVERY 6 MONTHS. ADDITIONAL VISITS CAN BE VIRTUAL.  (THIS STATEMENT SHOULD BE DELETED.)    Last Office Visit with [facility] primary care provider: 7/23/20    Future Office visit:   Next 5 appointments (look out 90 days)    Sep 08, 2020  1:00 PM CDT  SHORT with Franca Mandujano MD  [facility] ([facility] [address]  [facility] [address]  [phone]          Controlled substance agreement:   Encounter-Level CSA - 06/06/2016:    Controlled Substance Agreement - Scan on 6/7/2016  8:22 AM: [facility] CONTROLLED SUBSTANCE AGREEMENT, 6/6/16     Patient-Level CSA:    There are no patient-level csa.         Last Urine Drug Screen: No results found for: CDAUT, No results found for: COMDAT, No results found for: THC13, PCP13, COC13, MAMP13, OPI13, AMP13, BZO13, TCA13, MTD13, BAR13, OXY13, PPX13, BUP13     Processing:  Rx to be electronically transmitted to pharmacy by provider      [email or web address]       checked in past 3 months?  No, route to RN     Controlled Substance Refill Request for Zolpidem  Problem List Complete:  No     PROVIDER TO CONSIDER COMPLETION OF PROBLEM LIST AND OVERVIEW/CONTROLLED SUBSTANCE AGREEMENT    Last Written Prescription Date:  6/25/20  Last Fill Quantity: 60,   # refills: 0    THE MOST RECENT OFFICE VISIT MUST BE WITHIN THE PAST 3 MONTHS. AT LEAST ONE FACE TO FACE VISIT MUST OCCUR EVERY 6 MONTHS. ADDITIONAL VISITS CAN BE VIRTUAL.  (THIS STATEMENT SHOULD BE DELETED.)    Last Office Visit with [facility] primary care provider:  7/23/20    Future Office visit:   Next 5 appointments (look out 90 days)    Sep 08, 2020  1:00 PM CDT  SHORT with Franca Mandujano MD  [facility] ([facility] [address]  [facility] [address]  [phone]          Controlled substance agreement:   Encounter-Level CSA - 06/06/2016:    Controlled Substance Agreement - Scan on 6/7/2016  8:22 AM: [facility] CONTROLLED SUBSTANCE AGREEMENT, 6/6/16     Patient-Level CSA:    There are no patient-level csa.         Last Urine Drug Screen: No results found for: CDAUT, No results found for: COMDAT, No results found for: THC13, PCP13, COC13, MAMP13, OPI13, AMP13, BZO13, TCA13, MTD13, BAR13, OXY13, PPX13, BUP13     Processing:  Rx to be electronically transmitted to pharmacy by provider      [email or web address]       checked in past 3 months?  No, route to RN

## 2020-08-07 NOTE — TELEPHONE ENCOUNTER
[place] reviewed:  not reviewed/not due - last done on 5/22/20  [place]:  [email or web address]

## 2020-08-09 RX ORDER — ZOLPIDEM TARTRATE 6.25 MG/1
6.25 TABLET, FILM COATED, EXTENDED RELEASE ORAL
Qty: 60 TABLET | Refills: 0 | Status: SHIPPED | OUTPATIENT
Start: 2020-08-09 | End: 2020-10-23

## 2020-08-09 RX ORDER — DEXTROAMPHETAMINE SACCHARATE, AMPHETAMINE ASPARTATE, DEXTROAMPHETAMINE SULFATE AND AMPHETAMINE SULFATE 5; 5; 5; 5 MG/1; MG/1; MG/1; MG/1
TABLET ORAL
Qty: 60 TABLET | Refills: 0 | Status: SHIPPED | OUTPATIENT
Start: 2020-08-09 | End: 2020-09-08

## 2020-08-25 ENCOUNTER — VIRTUAL VISIT (OUTPATIENT)
Dept: INTERNAL MEDICINE | Facility: CLINIC | Age: 35
End: 2020-08-25
Payer: COMMERCIAL

## 2020-08-25 DIAGNOSIS — K21.9 GASTROESOPHAGEAL REFLUX DISEASE WITHOUT ESOPHAGITIS: Primary | ICD-10-CM

## 2020-08-25 PROCEDURE — 99213 OFFICE O/P EST LOW 20 MIN: CPT | Mod: TEL | Performed by: NURSE PRACTITIONER

## 2020-08-25 NOTE — PROGRESS NOTES
"Melissa Benavidez is a 34 year old female who is being evaluated via a billable telephone visit.      The patient has been notified of following:     \"This telephone visit will be conducted via a call between you and your physician/provider. We have found that certain health care needs can be provided without the need for a physical exam.  This service lets us provide the care you need with a short phone conversation.  If a prescription is necessary we can send it directly to your pharmacy.  If lab work is needed we can place an order for that and you can then stop by our lab to have the test done at a later time.    Telephone visits are billed at different rates depending on your insurance coverage. During this emergency period, for some insurers they may be billed the same as an in-person visit.  Please reach out to your insurance provider with any questions.    If during the course of the call the physician/provider feels a telephone visit is not appropriate, you will not be charged for this service.\"    Patient has given verbal consent for Telephone visit?  Yes    What phone number would you like to be contacted at? [phone]    How would you like to obtain your AVS? Mail a copy    Subjective     Melissa Benavidez is a 34 year old female who presents via phone visit today for the following health issues:    HPI    GERD flare up episodes where she has missed work 7/22-23, 8/10, and 8/19-20.  Requests RTW letter  Interested in GI f/u for worsening sx.           Review of Systems   Constitutional, HEENT, cardiovascular, pulmonary, gi and gu systems are negative, except as otherwise noted.       Objective          Vitals:  No vitals were obtained today due to virtual visit.      PSYCH: Alert and oriented times 3; coherent speech, normal   rate and volume, able to articulate logical thoughts, able   to abstract reason, no tangential thoughts, no hallucinations   or delusions  Her affect is normal  RESP: No " cough, no audible wheezing, able to talk in full sentences  Remainder of exam unable to be completed due to telephone visits            Assessment/Plan:    Assessment & Plan     Gastroesophageal reflux disease without esophagitis    - GASTROENTEROLOGY ADULT REF PROCEDURE ONLY; Future     Tobacco Cessation:   reports that she has been smoking. She has a 1.50 pack-year smoking history. She has never used smokeless tobacco.          Has [facility] PCP 9/8/20  RTW letter done for dates absent from work  The current medical regimen is effective;  continue present plan and medications.  GI referral for EGD and GERD management    Radha Vang NP  [facility]    Phone call duration:  9 minutes

## 2020-08-25 NOTE — LETTER
August 25, 2020      Melissa Benavidez  [address] [facility] [address]        To Whom It May Concern:    Melissa Benavidez was seen in our clinic.  She reports work absences on   7/22-23/20, 8/10/20, and 8/19-20/20 due to flare ups of gastroesophageal reflux.  She continues to work with her primary provider for symptom management.    Sincerely,        Radha Vang NP

## 2020-09-08 ENCOUNTER — OFFICE VISIT (OUTPATIENT)
Dept: INTERNAL MEDICINE | Facility: CLINIC | Age: 35
End: 2020-09-08
Payer: COMMERCIAL

## 2020-09-08 VITALS
WEIGHT: 130 LBS | SYSTOLIC BLOOD PRESSURE: 120 MMHG | BODY MASS INDEX: 22.2 KG/M2 | RESPIRATION RATE: 12 BRPM | HEART RATE: 72 BPM | TEMPERATURE: 98.9 F | OXYGEN SATURATION: 98 % | HEIGHT: 64 IN | DIASTOLIC BLOOD PRESSURE: 72 MMHG

## 2020-09-08 DIAGNOSIS — F90.9 ATTENTION DEFICIT HYPERACTIVITY DISORDER (ADHD), UNSPECIFIED ADHD TYPE: ICD-10-CM

## 2020-09-08 DIAGNOSIS — M79.672 LEFT FOOT PAIN: ICD-10-CM

## 2020-09-08 DIAGNOSIS — Z00.00 ROUTINE GENERAL MEDICAL EXAMINATION AT A HEALTH CARE FACILITY: Primary | ICD-10-CM

## 2020-09-08 DIAGNOSIS — Z79.899 CONTROLLED SUBSTANCE AGREEMENT SIGNED: ICD-10-CM

## 2020-09-08 DIAGNOSIS — E55.9 VITAMIN D DEFICIENCY: ICD-10-CM

## 2020-09-08 DIAGNOSIS — F33.0 MAJOR DEPRESSIVE DISORDER, RECURRENT EPISODE, MILD (H): ICD-10-CM

## 2020-09-08 DIAGNOSIS — F90.1 ATTENTION-DEFICIT HYPERACTIVITY DISORDER, PREDOMINANTLY HYPERACTIVE TYPE: ICD-10-CM

## 2020-09-08 DIAGNOSIS — Z86.19 HISTORY OF HPV INFECTION: ICD-10-CM

## 2020-09-08 PROCEDURE — 80053 COMPREHEN METABOLIC PANEL: CPT | Performed by: INTERNAL MEDICINE

## 2020-09-08 PROCEDURE — 80061 LIPID PANEL: CPT | Performed by: INTERNAL MEDICINE

## 2020-09-08 PROCEDURE — 87624 HPV HI-RISK TYP POOLED RSLT: CPT | Performed by: INTERNAL MEDICINE

## 2020-09-08 PROCEDURE — 88175 CYTOPATH C/V AUTO FLUID REDO: CPT | Performed by: INTERNAL MEDICINE

## 2020-09-08 PROCEDURE — 82306 VITAMIN D 25 HYDROXY: CPT | Performed by: INTERNAL MEDICINE

## 2020-09-08 PROCEDURE — 36415 COLL VENOUS BLD VENIPUNCTURE: CPT | Performed by: INTERNAL MEDICINE

## 2020-09-08 PROCEDURE — 84443 ASSAY THYROID STIM HORMONE: CPT | Performed by: INTERNAL MEDICINE

## 2020-09-08 PROCEDURE — 99395 PREV VISIT EST AGE 18-39: CPT | Performed by: INTERNAL MEDICINE

## 2020-09-08 RX ORDER — BUPROPION HYDROCHLORIDE 300 MG/1
300 TABLET ORAL EVERY MORNING
Qty: 90 TABLET | Refills: 0 | Status: SHIPPED | OUTPATIENT
Start: 2020-09-08

## 2020-09-08 RX ORDER — DEXTROAMPHETAMINE SACCHARATE, AMPHETAMINE ASPARTATE, DEXTROAMPHETAMINE SULFATE AND AMPHETAMINE SULFATE 5; 5; 5; 5 MG/1; MG/1; MG/1; MG/1
TABLET ORAL
Qty: 60 TABLET | Refills: 0 | Status: SHIPPED | OUTPATIENT
Start: 2020-09-08 | End: 2020-10-23

## 2020-09-08 ASSESSMENT — ANXIETY QUESTIONNAIRES
IF YOU CHECKED OFF ANY PROBLEMS ON THIS QUESTIONNAIRE, HOW DIFFICULT HAVE THESE PROBLEMS MADE IT FOR YOU TO DO YOUR WORK, TAKE CARE OF THINGS AT HOME, OR GET ALONG WITH OTHER PEOPLE: VERY DIFFICULT
3. WORRYING TOO MUCH ABOUT DIFFERENT THINGS: MORE THAN HALF THE DAYS
GAD7 TOTAL SCORE: 14
1. FEELING NERVOUS, ANXIOUS, OR ON EDGE: NEARLY EVERY DAY
2. NOT BEING ABLE TO STOP OR CONTROL WORRYING: NEARLY EVERY DAY
7. FEELING AFRAID AS IF SOMETHING AWFUL MIGHT HAPPEN: MORE THAN HALF THE DAYS
5. BEING SO RESTLESS THAT IT IS HARD TO SIT STILL: NOT AT ALL
6. BECOMING EASILY ANNOYED OR IRRITABLE: NEARLY EVERY DAY

## 2020-09-08 ASSESSMENT — ENCOUNTER SYMPTOMS
SORE THROAT: 0
HEARTBURN: 1
HEMATURIA: 0
HEADACHES: 0
CONSTIPATION: 1
SHORTNESS OF BREATH: 0
MYALGIAS: 1
FREQUENCY: 0
DIZZINESS: 0
NAUSEA: 1
EYE PAIN: 0
FEVER: 0
ARTHRALGIAS: 1
BREAST MASS: 0
PALPITATIONS: 0
PARESTHESIAS: 0
HEMATOCHEZIA: 0
DYSURIA: 0
JOINT SWELLING: 0
WEAKNESS: 0
COUGH: 0
CHILLS: 1
ABDOMINAL PAIN: 1
NERVOUS/ANXIOUS: 1
DIARRHEA: 0

## 2020-09-08 ASSESSMENT — PATIENT HEALTH QUESTIONNAIRE - PHQ9
SUM OF ALL RESPONSES TO PHQ QUESTIONS 1-9: 11
10. IF YOU CHECKED OFF ANY PROBLEMS, HOW DIFFICULT HAVE THESE PROBLEMS MADE IT FOR YOU TO DO YOUR WORK, TAKE CARE OF THINGS AT HOME, OR GET ALONG WITH OTHER PEOPLE: EXTREMELY DIFFICULT
5. POOR APPETITE OR OVEREATING: SEVERAL DAYS
SUM OF ALL RESPONSES TO PHQ QUESTIONS 1-9: 11

## 2020-09-08 ASSESSMENT — MIFFLIN-ST. JEOR: SCORE: 1284.68

## 2020-09-08 NOTE — LETTER
September 16, 2020      Melissa S Pramod  [address] [facility] [address]        Dear ,    We are happy to inform you that your recent Pap smear and Human Papillomavirus (HPV) test results are normal and negative.    It is recommended that you have your next Pap smear and Human Papillomavirus (HPV) test in 1 year. You will also need to return to the clinic every year for an annual wellness visit.    If you have additional questions regarding this result, please contact our office and we will be happy to assist you.      Sincerely,    Your [facility] Care Team

## 2020-09-08 NOTE — NURSING NOTE
"/72   Pulse 72   Temp 98.9  F (37.2  C) (Oral)   Resp 12   Ht 1.626 m (5' 4\")   Wt 59 kg (130 lb)   LMP  (LMP Unknown)   SpO2 98%   Breastfeeding No   BMI 22.31 kg/m      "

## 2020-09-08 NOTE — PROGRESS NOTES
SUBJECTIVE:   CC: Melissa Benavidez is an 34 year old woman who presents for preventive health visit.     Healthy Habits:     Getting at least 3 servings of Calcium per day:  Yes    Bi-annual eye exam:  Yes    Dental care twice a year:  Yes    Sleep apnea or symptoms of sleep apnea:  None    Diet:  Regular (no restrictions)    Frequency of exercise:  None    Taking medications regularly:  Yes    Medication side effects:  None    PHQ-2 Total Score: 4    Additional concerns today:  Yes      Today's PHQ-2 Score:   PHQ-2 (  [employer]) 2020   Q1: Little interest or pleasure in doing things 2   Q2: Feeling down, depressed or hopeless 2   PHQ-2 Score 4   Q1: Little interest or pleasure in doing things More than half the days   Q2: Feeling down, depressed or hopeless More than half the days   PHQ-2 Score 4   ADHD. Stable on medications.    Abuse: Current or Past (Physical, Sexual or Emotional) - No  Do you feel safe in your environment? Yes        Social History     Tobacco Use     Smoking status: Current Every Day Smoker     Packs/day: 0.50     Years: 3.00     Pack years: 1.50     Last attempt to quit: 2012     Years since quittin.7     Smokeless tobacco: Never Used     Tobacco comment: 5 -10 cigarrets daily   Substance Use Topics     Alcohol use: Yes     Alcohol/week: 0.0 standard drinks     Comment: 1-2 drinks/week     If you drink alcohol do you typically have >3 drinks per day or >7 drinks per week? No    Alcohol Use 2020   Prescreen: >3 drinks/day or >7 drinks/week? No   Prescreen: >3 drinks/day or >7 drinks/week? -       Reviewed orders with patient.  Reviewed health maintenance and updated orders accordingly - Yes      Pertinent mammograms are reviewed under the imaging tab.  History of abnormal Pap smear: positive HPV 2018  PAP / HPV Latest Ref Rng & Units 2018   PAP - NIL NIL NIL   HPV 16 DNA NEG:Negative Negative - -   HPV 18 DNA NEG:Negative Negative - -   OTHER  "HR HPV NEG:Negative Positive(A) - -     Reviewed and updated as needed this visit by clinical staff  Tobacco  Allergies  Meds  Problems  Med Hx  Surg Hx  Fam Hx  Soc Hx          Reviewed and updated as needed this visit by Provider  Allergies  Meds  Problems  Med Hx  Surg Hx            Review of Systems   Constitutional: Positive for chills. Negative for fever.   HENT: Negative for congestion, ear pain, hearing loss and sore throat.    Eyes: Negative for pain and visual disturbance.   Respiratory: Negative for cough and shortness of breath.    Cardiovascular: Negative for chest pain, palpitations and peripheral edema.   Gastrointestinal: Positive for abdominal pain, constipation, heartburn and nausea. Negative for diarrhea and hematochezia.   Breasts:  Negative for tenderness, breast mass and discharge.   Genitourinary: Negative for dysuria, frequency, genital sores, hematuria, pelvic pain, urgency, vaginal bleeding and vaginal discharge.   Musculoskeletal: Positive for arthralgias and myalgias. Negative for joint swelling.   Skin: Negative for rash.   Neurological: Negative for dizziness, weakness, headaches and paresthesias.   Psychiatric/Behavioral: Negative for mood changes. The patient is nervous/anxious.      Left foot pain. Left pinky toe fractured 2 years ago  Last 4 months the pain has recurred     OBJECTIVE:   /72   Pulse 72   Temp 98.9  F (37.2  C) (Oral)   Resp 12   Ht 1.626 m (5' 4\")   Wt 59 kg (130 lb)   LMP  (LMP Unknown)   SpO2 98%   Breastfeeding No   BMI 22.31 kg/m    Physical Exam  GENERAL APPEARANCE: healthy, alert and no distress  EYES: Eyes grossly normal to inspection, PERRL and conjunctivae and sclerae normal  HENT: ear canals and TM's normal, nose and mouth without ulcers or lesions, oropharynx clear and oral mucous membranes moist  NECK: no adenopathy, no asymmetry, masses, or scars and thyroid normal to palpation  RESP: lungs clear to auscultation - no rales, " rhonchi or wheezes  BREAST: normal without masses, tenderness or nipple discharge and no palpable axillary masses or adenopathy  CV: regular rate and rhythm, normal S1 S2, no S3 or S4, no murmur, click or rub, no peripheral edema and peripheral pulses strong  ABDOMEN: soft, nontender, no hepatosplenomegaly, no masses and bowel sounds normal  Pelvic exam: normal vagina and vulva, normal cervix without lesions or tenderness, uterus normal size anteverted, adenxa normal in size without tenderness, pap smear done  MS: no musculoskeletal defects are noted and gait is age appropriate without ataxia  SKIN: no suspicious lesions or rashes  NEURO: Normal strength and tone, sensory exam grossly normal, mentation intact and speech normal  PSYCH: mentation appears normal and affect normal/bright    Diagnostic Test Results:  Labs reviewed in Epic    ASSESSMENT/PLAN:       ICD-10-CM    1. Routine general medical examination at a health care facility  Z00.00 Lipid panel reflex to direct LDL Fasting     Comprehensive metabolic panel (BMP + Alb, Alk Phos, ALT, AST, Total. Bili, TP)     TSH with free T4 reflex     Vitamin D Deficiency     CBC with platelets and differential     CANCELED: CBC with platelets and differential   2. Major depressive disorder, recurrent episode, mild (H)  F33.0 buPROPion (WELLBUTRIN XL) 300 MG 24 hr tablet   3. Attention deficit hyperactivity disorder (ADHD), unspecified ADHD type  F90.9    4. History of HPV infection  Z86.19 Pap imaged thin layer diagnostic with HPV (select HPV order below)     HPV High Risk Types DNA Cervical   5. Left foot pain  M79.672 Orthopedic [facility] Spine  Referral   6. Vitamin D deficiency  E55.9 CANCELED: Vitamin D Deficiency   7. Attention-deficit hyperactivity disorder, predominantly hyperactive type  F90.1 amphetamine-dextroamphetamine (ADDERALL) 20 MG tablet   8. Controlled substance agreement signed- ok 3/12/19  Z79.899 amphetamine-dextroamphetamine (ADDERALL) 20 MG  "tablet   increase wellbutrin form 150mg to 300mg  Consider increasing prozac if PHQ9 still over 5    Follow up in 1-2 months    COUNSELING:  Reviewed preventive health counseling, as reflected in patient instructions       Regular exercise       Healthy diet/nutrition    Estimated body mass index is 22.31 kg/m  as calculated from the following:    Height as of this encounter: 1.626 m (5' 4\").    Weight as of this encounter: 59 kg (130 lb).        She reports that she has been smoking. She has a 1.50 pack-year smoking history. She has never used smokeless tobacco.  Tobacco Cessation Action Plan:   On wellbutrin      Counseling Resources:  ATP IV Guidelines  Pooled Cohorts Equation Calculator  Breast Cancer Risk Calculator  BRCA-Related Cancer Risk Assessment: FHS-7 Tool  FRAX Risk Assessment  ICSI Preventive Guidelines  Dietary Guidelines for Americans, 2010  USDA's MyPlate  ASA Prophylaxis  Lung CA Screening    Frnaca Mandujano MD  [facility]  Answers for HPI/ROS submitted by the patient on 9/8/2020   Annual Exam:  If you checked off any problems, how difficult have these problems made it for you to do your work, take care of things at home, or get along with other people?: Extremely difficult  PHQ9 TOTAL SCORE: 11    "

## 2020-09-08 NOTE — LETTER
September 15, 2020      Melissa Benavidez  [address] [facility] [address]        Dear ,    We are writing to inform you of your test results.    The cholesterol level LDL is at goal.   The kidney function (GFR) and liver function tests (AST and ALT) are within normal limits.     The fasting glucose is within normal limits.   The thyroid test (TSH) is within normal limits.     The vitamin D is low.   Recommend vitamin D 50,000 international unit(s) weekly for 2 months followed by vitamin D 2000 international unit(s) daily .   Recheck vitamin D in 2 months.     Resulted Orders   Lipid panel reflex to direct LDL Fasting   Result Value Ref Range    Cholesterol 218 (H) <200 mg/dL      Comment:      Desirable:       <200 mg/dl    Triglycerides 72 <150 mg/dL      Comment:      Fasting specimen    HDL Cholesterol 113 >49 mg/dL    LDL Cholesterol Calculated 91 <100 mg/dL      Comment:      Desirable:       <100 mg/dl    Non HDL Cholesterol 105 <130 mg/dL   Comprehensive metabolic panel (BMP + Alb, Alk Phos, ALT, AST, Total. Bili, TP)   Result Value Ref Range    Sodium 136 133 - 144 mmol/L    Potassium 3.4 3.4 - 5.3 mmol/L    Chloride 106 94 - 109 mmol/L    Carbon Dioxide 23 20 - 32 mmol/L    Anion Gap 7 3 - 14 mmol/L    Glucose 86 70 - 99 mg/dL      Comment:      Fasting specimen    Urea Nitrogen 7 7 - 30 mg/dL    Creatinine 0.83 0.52 - 1.04 mg/dL    GFR Estimate >90 >60 mL/min/[1.73_m2]      Comment:      Non  GFR Calc  Starting 12/18/2018, serum creatinine based estimated GFR (eGFR) will be   calculated using the Chronic Kidney Disease Epidemiology Collaboration   (CKD-EPI) equation.      GFR Estimate If Black >90 >60 mL/min/[1.73_m2]      Comment:       GFR Calc  Starting 12/18/2018, serum creatinine based estimated GFR (eGFR) will be   calculated using the Chronic Kidney Disease Epidemiology Collaboration   (CKD-EPI) equation.      Calcium 8.8 8.5 - 10.1 mg/dL     Bilirubin Total 0.4 0.2 - 1.3 mg/dL    Albumin 4.0 3.4 - 5.0 g/dL    Protein Total 8.1 6.8 - 8.8 g/dL    Alkaline Phosphatase 47 40 - 150 U/L    ALT 29 0 - 50 U/L    AST 20 0 - 45 U/L   TSH with free T4 reflex   Result Value Ref Range    TSH 0.95 0.40 - 4.00 mU/L   Vitamin D Deficiency   Result Value Ref Range    Vitamin D Deficiency screening 10 (L) 20 - 75 ug/L      Comment:      Season, race, dietary intake, and treatment affect the concentration of   25-hydroxy-Vitamin D. Values may decrease during winter months and increase   during summer months. Values 20-29 ug/L may indicate Vitamin D insufficiency   and values <20 ug/L may indicate Vitamin D deficiency.  Vitamin D determination is routinely performed by an immunoassay specific for   25 hydroxyvitamin D3.  If an individual is on vitamin D2 (ergocalciferol)   supplementation, please specify 25 OH vitamin D2 and D3 level determination by   LCMSMS test [id number].         If you have any questions or concerns, please call the clinic at the number listed above.       Sincerely,        Franca Mandujano MD

## 2020-09-09 LAB
ALBUMIN SERPL-MCNC: 4 G/DL (ref 3.4–5)
ALP SERPL-CCNC: 47 U/L (ref 40–150)
ALT SERPL W P-5'-P-CCNC: 29 U/L (ref 0–50)
ANION GAP SERPL CALCULATED.3IONS-SCNC: 7 MMOL/L (ref 3–14)
AST SERPL W P-5'-P-CCNC: 20 U/L (ref 0–45)
BILIRUB SERPL-MCNC: 0.4 MG/DL (ref 0.2–1.3)
BUN SERPL-MCNC: 7 MG/DL (ref 7–30)
CALCIUM SERPL-MCNC: 8.8 MG/DL (ref 8.5–10.1)
CHLORIDE SERPL-SCNC: 106 MMOL/L (ref 94–109)
CHOLEST SERPL-MCNC: 218 MG/DL
CO2 SERPL-SCNC: 23 MMOL/L (ref 20–32)
CREAT SERPL-MCNC: 0.83 MG/DL (ref 0.52–1.04)
DEPRECATED CALCIDIOL+CALCIFEROL SERPL-MC: 10 UG/L (ref 20–75)
GFR SERPL CREATININE-BSD FRML MDRD: >90 ML/MIN/{1.73_M2}
GLUCOSE SERPL-MCNC: 86 MG/DL (ref 70–99)
HDLC SERPL-MCNC: 113 MG/DL
LDLC SERPL CALC-MCNC: 91 MG/DL
NONHDLC SERPL-MCNC: 105 MG/DL
POTASSIUM SERPL-SCNC: 3.4 MMOL/L (ref 3.4–5.3)
PROT SERPL-MCNC: 8.1 G/DL (ref 6.8–8.8)
SODIUM SERPL-SCNC: 136 MMOL/L (ref 133–144)
TRIGL SERPL-MCNC: 72 MG/DL
TSH SERPL DL<=0.005 MIU/L-ACNC: 0.95 MU/L (ref 0.4–4)

## 2020-09-09 ASSESSMENT — ANXIETY QUESTIONNAIRES: GAD7 TOTAL SCORE: 14

## 2020-09-13 LAB
COPATH REPORT: NORMAL
PAP: NORMAL

## 2020-09-15 ENCOUNTER — PATIENT OUTREACH (OUTPATIENT)
Dept: INTERNAL MEDICINE | Facility: CLINIC | Age: 35
End: 2020-09-15

## 2020-09-15 LAB
FINAL DIAGNOSIS: NORMAL
HPV HR 12 DNA CVX QL NAA+PROBE: NEGATIVE
HPV16 DNA SPEC QL NAA+PROBE: NEGATIVE
HPV18 DNA SPEC QL NAA+PROBE: NEGATIVE
SPECIMEN DESCRIPTION: NORMAL
SPECIMEN SOURCE CVX/VAG CYTO: NORMAL

## 2020-09-15 NOTE — TELEPHONE ENCOUNTER
2003, 2005, 2006, 2007, 2010, 2011, 2013, 2015 - all NIL paps  6/26/18 NIL pap, + HR HPV (not 16/18). Plan 1 year cotest  07/31/19 Patient is lost to pap tracking follow-up.   9/8/20 NIL pap, neg HR HPV. Plan 1 year cotest

## 2020-09-16 NOTE — TELEPHONE ENCOUNTER
Left message on home number asking patient to return call to clinic.  What does patient need done differently or changed on paperwork?  We may need her to drop off another copy of the form she is referring to.  ADELINA Berman R.N.

## 2020-09-16 NOTE — TELEPHONE ENCOUNTER
Patient calling to see if form was corrected and resent.  If not, she can bring a new copy to the clinic today to complete.  Please advise as soon as possible.

## 2020-09-17 NOTE — TELEPHONE ENCOUNTER
"Patient called back and needs \"duration\" completed in Part B question 7. Patient suggested duration should state 2 days per episode. Please fax back ASAP as form is due on 9/21/20  "

## 2020-09-18 ENCOUNTER — HOSPITAL ENCOUNTER (OUTPATIENT)
Facility: CLINIC | Age: 35
End: 2020-09-18
Attending: SURGERY | Admitting: SURGERY
Payer: COMMERCIAL

## 2020-09-18 DIAGNOSIS — Z11.59 ENCOUNTER FOR SCREENING FOR OTHER VIRAL DISEASES: Primary | ICD-10-CM

## 2020-09-21 RX ORDER — LIDOCAINE 40 MG/G
CREAM TOPICAL
Status: CANCELLED | OUTPATIENT
Start: 2020-09-21

## 2020-09-21 RX ORDER — ONDANSETRON 2 MG/ML
4 INJECTION INTRAMUSCULAR; INTRAVENOUS
Status: CANCELLED | OUTPATIENT
Start: 2020-09-21

## 2020-10-22 DIAGNOSIS — K21.9 GASTROESOPHAGEAL REFLUX DISEASE WITHOUT ESOPHAGITIS: ICD-10-CM

## 2020-10-22 DIAGNOSIS — F41.9 ANXIETY: ICD-10-CM

## 2020-10-22 DIAGNOSIS — F90.1 ATTENTION-DEFICIT HYPERACTIVITY DISORDER, PREDOMINANTLY HYPERACTIVE TYPE: ICD-10-CM

## 2020-10-22 DIAGNOSIS — F33.0 MAJOR DEPRESSIVE DISORDER, RECURRENT EPISODE, MILD (H): ICD-10-CM

## 2020-10-22 DIAGNOSIS — Z79.899 CONTROLLED SUBSTANCE AGREEMENT SIGNED: ICD-10-CM

## 2020-10-22 DIAGNOSIS — F51.01 PRIMARY INSOMNIA: ICD-10-CM

## 2020-10-22 NOTE — TELEPHONE ENCOUNTER
"Requested Prescriptions   Pending Prescriptions Disp Refills                          FLUoxetine (PROZAC) 20 MG capsule  Last Written Prescription Date:  04/17/20  Last Fill Quantity: 90,  # refills: 1   Last office visit: 9/8/2020 with prescribing provider:  09/08/20   Future Office Visit:           90 capsule 1     Sig: Take 1 capsule (20 mg) by mouth daily       SSRIs Protocol Failed - 10/22/2020  8:08 AM        Failed - PHQ-9 score less than 5 in past 6 months     Please review last PHQ-9 score.           Passed - Medication is active on med list        Passed - Patient is age 18 or older        Passed - No active pregnancy on record        Passed - No positive pregnancy test in last 12 months        Passed - Recent (6 mo) or future (30 days) visit within the authorizing provider's specialty     Patient had office visit in the last 6 months or has a visit in the next 30 days with authorizing provider or within the authorizing provider's specialty.  See \"Patient Info\" tab in inbasket, or \"Choose Columns\" in Meds & Orders section of the refill encounter.                                    Ambien      Last Written Prescription Date:  08/09/20  Last Fill Quantity: 60,   # refills: 0  Last Office Visit: 09/08/20  Future Office visit:       Routing refill request to provider for review/approval because:  Drug not on the [employer], [facility] or [facility] refill protocol or controlled substance    Adderall      Last Written Prescription Date:  09/08/20  Last Fill Quantity: 60,   # refills: 0  Last Office Visit: 09/08/20  Future Office visit:       Routing refill request to provider for review/approval because:  Drug not on the [facility], [facility] or [facility] refill protocol or controlled substance    "

## 2020-10-23 RX ORDER — ZOLPIDEM TARTRATE 6.25 MG/1
6.25 TABLET, FILM COATED, EXTENDED RELEASE ORAL
Qty: 30 TABLET | Refills: 0 | Status: SHIPPED | OUTPATIENT
Start: 2020-10-23 | End: 2021-01-21

## 2020-10-23 RX ORDER — DEXTROAMPHETAMINE SACCHARATE, AMPHETAMINE ASPARTATE, DEXTROAMPHETAMINE SULFATE AND AMPHETAMINE SULFATE 5; 5; 5; 5 MG/1; MG/1; MG/1; MG/1
TABLET ORAL
Qty: 60 TABLET | Refills: 0 | Status: SHIPPED | OUTPATIENT
Start: 2020-10-23 | End: 2021-01-21

## 2020-10-23 NOTE — TELEPHONE ENCOUNTER
Patient is supposed to follow-up with her primary, her last visit 9/8 said follow-up in 1 to 2 months.  I will do one prescription each but will need to schedule with Dr. Mandujano prior to the next refills.

## 2020-10-23 NOTE — TELEPHONE ENCOUNTER
[employer] message sent to patient with provider's message below.

## 2020-10-23 NOTE — TELEPHONE ENCOUNTER
Controlled Substance Refill Request for adderall, ambien  Problem List Complete:  No     PROVIDER TO CONSIDER COMPLETION OF PROBLEM LIST AND OVERVIEW/CONTROLLED SUBSTANCE AGREEMENT    Last Written Prescription Date:  9/8/20, 8/9/20  Last Fill Quantity: 60,   # refills: 0    Last Office Visit with [facility] primary care provider: 9/8/20    Future Office visit:     Controlled substance agreement:   Encounter-Level CSA - 06/06/2016:    Controlled Substance Agreement - Scan on 6/7/2016  8:22 AM: [place] CONTROLLED SUBSTANCE AGREEMENT, 6/6/16     Patient-Level CSA:    There are no patient-level csa.         Last Urine Drug Screen: No results found for: CDAUT, No results found for: COMDAT, No results found for: THC13, PCP13, COC13, MAMP13, OPI13, AMP13, BZO13, TCA13, MTD13, BAR13, OXY13, PPX13, BUP13     Processing:  Rx to be electronically transmitted to pharmacy by provider      [email or web address]       checked in past 3 months?  Yes checked- no concerns    Last filled 9/14/20        Routing refill request to provider for review/approval because:  phq 9 score out of protocol range          PHQ 4/17/2020 9/8/2020 9/8/2020   PHQ-9 Total Score 13 11 12   Q9: Thoughts of better off dead/self-harm past 2 weeks Several days Not at all Not at all           FLUoxetine (PROZAC) 20 MG capsule 90 capsule 1     Sig: Take 1 capsule (20 mg) by mouth daily       SSRIs Protocol Failed - 10/23/2020  1:43 PM        Failed - PHQ-9 score less than 5 in past 6 months     Please review last PHQ-9 score.           Passed - Medication is active on med list        Passed - Patient is age 18 or older        Passed - No active pregnancy on record        Passed - No positive pregnancy test in last 12 months        Passed - Recent (6 mo) or future (30 days) visit within the authorizing provider's specialty     Patient had office visit in the last 6 months or has a visit in the next 30 days with authorizing provider or within the  "authorizing provider's specialty.  See \"Patient Info\" tab in inbasket, or \"Choose Columns\" in Meds & Orders section of the refill encounter.              Routed to covering team  "

## 2020-11-12 ENCOUNTER — TELEPHONE (OUTPATIENT)
Dept: INTERNAL MEDICINE | Facility: CLINIC | Age: 35
End: 2020-11-12

## 2020-11-12 DIAGNOSIS — Z30.09 BIRTH CONTROL COUNSELING: Primary | ICD-10-CM

## 2020-11-12 NOTE — TELEPHONE ENCOUNTER
Last Depo-Provera order was from 12/2018 and we have no record of patient receiving this injection.  Will need order for her to start receiving them here.      Call patient once orders have been placed.   ADELINA Berman R.N.

## 2020-11-13 RX ORDER — MEDROXYPROGESTERONE ACETATE 150 MG/ML
150 INJECTION, SUSPENSION INTRAMUSCULAR
Qty: 1 ML | Refills: 0 | OUTPATIENT
Start: 2020-11-13

## 2020-11-16 RX ORDER — MEDROXYPROGESTERONE ACETATE 150 MG/ML
150 INJECTION, SUSPENSION INTRAMUSCULAR
Status: ACTIVE | OUTPATIENT
Start: 2020-11-16 | End: 2021-11-11

## 2020-11-16 NOTE — TELEPHONE ENCOUNTER
Depo order placed for clinic administration.   Left voice message for patient to call back.     Please advise patient order placed for Depo and she can schedule a nurse appointment, but will need to have urine pregnancy test prior to giving Depo injection.

## 2020-11-25 NOTE — TELEPHONE ENCOUNTER
My Chart message sent to patient advising her of order and need for pregnancy test if there has been a break in getting her shot.  ADELINA Berman R.N.

## 2020-11-29 ENCOUNTER — HEALTH MAINTENANCE LETTER (OUTPATIENT)
Age: 35
End: 2020-11-29

## 2021-01-20 DIAGNOSIS — F51.01 PRIMARY INSOMNIA: ICD-10-CM

## 2021-01-20 DIAGNOSIS — F41.9 ANXIETY: ICD-10-CM

## 2021-01-20 DIAGNOSIS — F90.1 ATTENTION-DEFICIT HYPERACTIVITY DISORDER, PREDOMINANTLY HYPERACTIVE TYPE: ICD-10-CM

## 2021-01-20 DIAGNOSIS — F33.0 MAJOR DEPRESSIVE DISORDER, RECURRENT EPISODE, MILD (H): ICD-10-CM

## 2021-01-20 DIAGNOSIS — Z79.899 CONTROLLED SUBSTANCE AGREEMENT SIGNED: ICD-10-CM

## 2021-01-20 NOTE — TELEPHONE ENCOUNTER
Pt calling for attached refill requests.    Controlled Substance Refill Request for Adderall  Problem List Complete:  No     PROVIDER TO CONSIDER COMPLETION OF PROBLEM LIST AND OVERVIEW/CONTROLLED SUBSTANCE AGREEMENT    Last Written Prescription Date:  10/23/20  Last Fill Quantity: 60,   # refills: 0    THE MOST RECENT OFFICE VISIT MUST BE WITHIN THE PAST 3 MONTHS. AT LEAST ONE FACE TO FACE VISIT MUST OCCUR EVERY 6 MONTHS. ADDITIONAL VISITS CAN BE VIRTUAL.  (THIS STATEMENT SHOULD BE DELETED.)    Last Office Visit with [facility] [place] [facility] primary care provider: 9/8/20    Future Office visit:     Controlled substance agreement:   Encounter-Level CSA - 06/06/2016:    Controlled Substance Agreement - Scan on 6/7/2016  8:22 AM: [place] CONTROLLED SUBSTANCE AGREEMENT, 6/6/16     Patient-Level CSA:    There are no patient-level csa.         Last Urine Drug Screen: No results found for: CDAUT, No results found for: COMDAT, No results found for: THC13, PCP13, COC13, MAMP13, OPI13, AMP13, BZO13, TCA13, MTD13, BAR13, OXY13, PPX13, BUP13     Processing:  Rx to be electronically transmitted to pharmacy by provider      [email or web address]       checked in past 3 months?  No, route to RN     Zolpidem      Last Written Prescription Date:  10/23/20  Last Fill Quantity: 30,   # refills: 0  Last Office Visit: 9/8/20  Future Office visit:       Routing refill request to provider for review/approval because:  Drug not on the [facility] [place] [facility], [facility] or [facility] refill protocol or controlled substance

## 2021-01-21 RX ORDER — DEXTROAMPHETAMINE SACCHARATE, AMPHETAMINE ASPARTATE, DEXTROAMPHETAMINE SULFATE AND AMPHETAMINE SULFATE 5; 5; 5; 5 MG/1; MG/1; MG/1; MG/1
TABLET ORAL
Qty: 60 TABLET | Refills: 0 | Status: SHIPPED | OUTPATIENT
Start: 2021-01-21 | End: 2021-03-11

## 2021-01-21 RX ORDER — ZOLPIDEM TARTRATE 6.25 MG/1
6.25 TABLET, FILM COATED, EXTENDED RELEASE ORAL
Qty: 30 TABLET | Refills: 0 | Status: SHIPPED | OUTPATIENT
Start: 2021-01-21 | End: 2021-04-28

## 2021-03-10 DIAGNOSIS — Z79.899 CONTROLLED SUBSTANCE AGREEMENT SIGNED: ICD-10-CM

## 2021-03-10 DIAGNOSIS — F90.1 ATTENTION-DEFICIT HYPERACTIVITY DISORDER, PREDOMINANTLY HYPERACTIVE TYPE: ICD-10-CM

## 2021-03-11 RX ORDER — DEXTROAMPHETAMINE SACCHARATE, AMPHETAMINE ASPARTATE, DEXTROAMPHETAMINE SULFATE AND AMPHETAMINE SULFATE 5; 5; 5; 5 MG/1; MG/1; MG/1; MG/1
TABLET ORAL
Qty: 60 TABLET | Refills: 0 | Status: SHIPPED | OUTPATIENT
Start: 2021-03-11 | End: 2021-04-28

## 2021-03-11 NOTE — TELEPHONE ENCOUNTER
Controlled Substance Refill Request for Adderall  Problem List Complete:  No     PROVIDER TO CONSIDER COMPLETION OF PROBLEM LIST AND OVERVIEW/CONTROLLED SUBSTANCE AGREEMENT    Last Written Prescription Date:  1/21/21  Last Fill Quantity: 60,   # refills: 0    Last Office Visit with [facility] [place] primary care provider: 9/8/20    Future Office visit:     Controlled substance agreement:   Encounter-Level CSA - 06/06/2016:    Controlled Substance Agreement - Scan on 6/7/2016  8:22 AM: [place] CONTROLLED SUBSTANCE AGREEMENT, 6/6/16     Patient-Level CSA:    There are no patient-level csa.         Last Urine Drug Screen: No results found for: CDAUT, No results found for: COMDAT, No results found for: THC13, PCP13, COC13, MAMP13, OPI13, AMP13, BZO13, TCA13, MTD13, BAR13, OXY13, PPX13, BUP13     [place] reviewed:  not reviewed/not due - last done on 1/21/21  [place]:  [email or web address]

## 2021-04-23 DIAGNOSIS — F33.0 MAJOR DEPRESSIVE DISORDER, RECURRENT EPISODE, MILD (H): ICD-10-CM

## 2021-04-23 DIAGNOSIS — F41.9 ANXIETY: ICD-10-CM

## 2021-04-23 DIAGNOSIS — F90.1 ATTENTION-DEFICIT HYPERACTIVITY DISORDER, PREDOMINANTLY HYPERACTIVE TYPE: ICD-10-CM

## 2021-04-23 DIAGNOSIS — F51.01 PRIMARY INSOMNIA: ICD-10-CM

## 2021-04-23 DIAGNOSIS — Z79.899 CONTROLLED SUBSTANCE AGREEMENT SIGNED: ICD-10-CM

## 2021-04-23 DIAGNOSIS — K21.9 GASTROESOPHAGEAL REFLUX DISEASE WITHOUT ESOPHAGITIS: ICD-10-CM

## 2021-04-23 NOTE — TELEPHONE ENCOUNTER
Pending Prescriptions:                       Disp   Refills    FLUoxetine (PROZAC) 20 MG capsule          90 cap*0        Sig: Take 1 capsule (20 mg) by mouth daily    omeprazole (PRILOSEC) 20 MG DR capsule     90 cap*3        Sig: Take 1 capsule (20 mg) by mouth daily    Routing refill request to provider for review/approval because:  PHQ-9 score:    PHQ 9/8/2020   PHQ-9 Total Score 12   Q9: Thoughts of better off dead/self-harm past 2 weeks Not at all

## 2021-04-28 RX ORDER — DEXTROAMPHETAMINE SACCHARATE, AMPHETAMINE ASPARTATE, DEXTROAMPHETAMINE SULFATE AND AMPHETAMINE SULFATE 5; 5; 5; 5 MG/1; MG/1; MG/1; MG/1
TABLET ORAL
Qty: 60 TABLET | Refills: 0 | Status: SHIPPED | OUTPATIENT
Start: 2021-04-28

## 2021-04-28 RX ORDER — ZOLPIDEM TARTRATE 6.25 MG/1
6.25 TABLET, FILM COATED, EXTENDED RELEASE ORAL
Qty: 30 TABLET | Refills: 0 | Status: SHIPPED | OUTPATIENT
Start: 2021-04-28

## 2021-04-28 NOTE — TELEPHONE ENCOUNTER
Pt scheduled virtual visit 5/17/21 with Dr. Mandujano. She will run out of medications tomorrow. Please advise. Thanks.

## 2021-08-25 ENCOUNTER — PATIENT OUTREACH (OUTPATIENT)
Dept: INTERNAL MEDICINE | Facility: CLINIC | Age: 36
End: 2021-08-25

## 2021-08-25 DIAGNOSIS — R87.810 CERVICAL HIGH RISK HPV (HUMAN PAPILLOMAVIRUS) TEST POSITIVE: ICD-10-CM

## 2021-08-25 NOTE — LETTER
August 25, 2021      Melissa S Pramod  [address] [address] [address]        Dear MsErlin,    This letter is to remind you that you are due for your follow-up Pap smear and Human Papillomavirus (HPV) test.    Please call [phone] to schedule your appointment at your earliest convenience.    If you have completed the appointment outside of the [facility] system, please have the records forwarded to our office. We will update your chart for your provider to review before your next annual wellness visit.     Thank you for choosing [facility]!      Sincerely,    Your [facility] Care Team

## 2021-09-25 ENCOUNTER — HEALTH MAINTENANCE LETTER (OUTPATIENT)
Age: 36
End: 2021-09-25

## 2021-10-25 NOTE — TELEPHONE ENCOUNTER
[sending to Dr. Vazquez due to Dr. Mandujano's absence]    FYI to provider - Patient is lost to pap tracking follow-up. Attempts to contact pt have been made per reminder process and there has been no reply and/or no appt scheduled.

## 2021-11-20 ENCOUNTER — HEALTH MAINTENANCE LETTER (OUTPATIENT)
Age: 36
End: 2021-11-20

## 2022-01-10 DIAGNOSIS — F90.1 ATTENTION-DEFICIT HYPERACTIVITY DISORDER, PREDOMINANTLY HYPERACTIVE TYPE: ICD-10-CM

## 2022-01-10 DIAGNOSIS — F41.9 ANXIETY: ICD-10-CM

## 2022-01-10 DIAGNOSIS — F33.0 MAJOR DEPRESSIVE DISORDER, RECURRENT EPISODE, MILD (H): ICD-10-CM

## 2022-01-10 DIAGNOSIS — Z79.899 CONTROLLED SUBSTANCE AGREEMENT SIGNED: ICD-10-CM

## 2022-01-10 NOTE — TELEPHONE ENCOUNTER
Patient is calling for refill. She said she had no idea Dr Mandujano has left and she never got a letter about this. She is also out of [place] right now taking care of a family member and she is not sure when she will return. She will schedule an appointment

## 2022-01-11 NOTE — TELEPHONE ENCOUNTER
Routing refill request to provider for review/approval because:  Patient needs to be seen because:  Overdue for appointment  phq 9 out of range and needs update  See message below    Routed to provider covering the letter J for Dr. Mandujano          PHQ 4/17/2020 9/8/2020 9/8/2020   PHQ-9 Total Score 13 11 12   Q9: Thoughts of better off dead/self-harm past 2 weeks Several days Not at all Not at all

## 2022-01-24 RX ORDER — DEXTROAMPHETAMINE SACCHARATE, AMPHETAMINE ASPARTATE, DEXTROAMPHETAMINE SULFATE AND AMPHETAMINE SULFATE 5; 5; 5; 5 MG/1; MG/1; MG/1; MG/1
TABLET ORAL
Qty: 60 TABLET | Refills: 0 | OUTPATIENT
Start: 2022-01-24

## 2022-05-26 NOTE — TELEPHONE ENCOUNTER
Attempted to reach patient to discuss recent test results. No answer, left message. Instructed her to call the clinic or the CNM pager by the end of daytime hours today as I am the CNM on call currently. I would also like to discuss her long-term use of DMPA.     Carmen Villanueva CNM on 11/8/2018 at 12:53 PM     2 = difficulty swallowing foods

## 2022-09-13 NOTE — TELEPHONE ENCOUNTER
Left V/m for pt asking her to call back to let us know where she wants it faxed to. I will keep at my desk until I hear  
Patient needs not for light duty at work faxed Wednesday morning  
Please call patient and ask where to fax letter to  
Pt came into clinic requesting a copy of the below addressed letter for work. Printed copy of letter for the pt.  
VM left for patient to call back. Does she still need/want the letter requested?  RUDI NUNES CMA    
abdomen/upper

## 2022-12-26 ENCOUNTER — HEALTH MAINTENANCE LETTER (OUTPATIENT)
Age: 37
End: 2022-12-26

## 2023-07-01 NOTE — TELEPHONE ENCOUNTER
Prozac     Last Written Prescription Date: 11/30/16   Last Fill Quantity: 90, # refills: 0  Last Office Visit with [facility] primary care provider:  1/10/17        Last PHQ-9 score on record=   PHQ-9 SCORE 6/6/2016   Total Score -   Total Score 5     Prescription approved per [facility] Refill Protocol.               [id number]

## 2024-02-04 ENCOUNTER — HEALTH MAINTENANCE LETTER (OUTPATIENT)
Age: 39
End: 2024-02-04

## 2024-02-26 NOTE — TELEPHONE ENCOUNTER
Bupropion       Last Written Prescription Date: 04/11/17  Last Fill Quantity: 90; # refills: 0  Last Office Visit with [facility], [facility] or [facility] prescribing provider:  02/15/17 Dahlia        Last PHQ-9 score on record=   PHQ-9 SCORE 6/6/2016   Total Score -   Total Score 5       Lab Results   Component Value Date    AST 16 07/08/2016     Lab Results   Component Value Date    ALT 31 07/08/2016       Labs showing if normal/abnormal  Lab Results   Component Value Date    AST 16 07/08/2016    ALT 31 07/08/2016        [place] PDMP reviewed. No abnormal activity noted. Last 30 day refill of 5s and 10s on 1/22/2024

## 2024-11-02 ASSESSMENT — PATIENT HEALTH QUESTIONNAIRE - PHQ9: SUM OF ALL RESPONSES TO PHQ QUESTIONS 1-9: 11
